# Patient Record
Sex: FEMALE | Race: WHITE | NOT HISPANIC OR LATINO | Employment: OTHER | URBAN - METROPOLITAN AREA
[De-identification: names, ages, dates, MRNs, and addresses within clinical notes are randomized per-mention and may not be internally consistent; named-entity substitution may affect disease eponyms.]

---

## 2017-09-21 ENCOUNTER — HOSPITAL ENCOUNTER (OUTPATIENT)
Dept: ONCOLOGY | Facility: CLINIC | Age: 62
Setting detail: INFUSION SERIES
Discharge: HOME | End: 2017-09-21
Attending: INTERNAL MEDICINE | Admitting: INTERNAL MEDICINE

## 2017-09-21 ENCOUNTER — CLINICAL SUPPORT (OUTPATIENT)
Dept: ONCOLOGY | Facility: HOSPITAL | Age: 62
End: 2017-09-21

## 2017-09-21 ENCOUNTER — HOSPITAL ENCOUNTER (OUTPATIENT)
Dept: ONCOLOGY | Facility: HOSPITAL | Age: 62
Discharge: HOME | End: 2017-09-21
Attending: INTERNAL MEDICINE | Admitting: INTERNAL MEDICINE

## 2017-09-21 LAB
ALBUMIN SERPL-MCNC: 4.2 G/DL (ref 3.5–4.8)
ALBUMIN/GLOB SERPL: 1.4 {RATIO} (ref 1–1.7)
ALP SERPL-CCNC: 67 IU/L (ref 32–91)
ALT SERPL-CCNC: 26 IU/L (ref 14–54)
ANION GAP SERPL CALC-SCNC: 14.5 MMOL/L (ref 10–20)
AST SERPL-CCNC: 29 IU/L (ref 15–41)
BILIRUB SERPL-MCNC: 1.1 MG/DL (ref 0.3–1.2)
BUN SERPL-MCNC: 13 MG/DL (ref 8–20)
BUN/CREAT SERPL: 14.4 (ref 5.4–26.2)
CALCIUM SERPL-MCNC: 9.6 MG/DL (ref 8.9–10.3)
CHLORIDE SERPL-SCNC: 100 MMOL/L (ref 101–111)
CONV CO2: 26 MMOL/L (ref 22–32)
CONV TOTAL PROTEIN: 7.1 G/DL (ref 6.1–7.9)
CREAT UR-MCNC: 0.9 MG/DL (ref 0.4–1)
GLOBULIN UR ELPH-MCNC: 2.9 G/DL (ref 2.5–3.8)
GLUCOSE SERPL-MCNC: 180 MG/DL (ref 65–99)
POTASSIUM SERPL-SCNC: 3.5 MMOL/L (ref 3.6–5.1)
SODIUM SERPL-SCNC: 137 MMOL/L (ref 136–144)

## 2017-09-21 NOTE — PROGRESS NOTES
PATIENTS ONCOLOGY RECORD LOCATED IN [facility]      Subjective     Name:  GUILHERME ANDERSON     Date:  2017  Address:  [address] [place] [address] [place] [address]  Home: [phone]  :  1955 AGE:  61 y.o.        RECORDS OBTAINED:  Patients Oncology Record is located in [facility]

## 2017-10-25 ENCOUNTER — HOSPITAL ENCOUNTER (OUTPATIENT)
Dept: OTHER | Facility: HOSPITAL | Age: 62
Discharge: HOME | End: 2017-10-25
Attending: INTERNAL MEDICINE | Admitting: INTERNAL MEDICINE

## 2018-09-20 ENCOUNTER — CLINICAL SUPPORT (OUTPATIENT)
Dept: ONCOLOGY | Facility: HOSPITAL | Age: 63
End: 2018-09-20

## 2018-09-20 ENCOUNTER — HOSPITAL ENCOUNTER (OUTPATIENT)
Dept: ONCOLOGY | Facility: HOSPITAL | Age: 63
Discharge: HOME | End: 2018-09-20
Attending: INTERNAL MEDICINE | Admitting: INTERNAL MEDICINE

## 2018-09-20 ENCOUNTER — HOSPITAL ENCOUNTER (OUTPATIENT)
Dept: ONCOLOGY | Facility: CLINIC | Age: 63
Setting detail: INFUSION SERIES
Discharge: HOME | End: 2018-09-20
Attending: INTERNAL MEDICINE | Admitting: INTERNAL MEDICINE

## 2018-09-20 LAB
ALBUMIN SERPL-MCNC: 4.2 G/DL (ref 3.5–4.8)
ALBUMIN/GLOB SERPL: 1.4 {RATIO} (ref 1–1.7)
ALP SERPL-CCNC: 69 IU/L (ref 32–91)
ALT SERPL-CCNC: 28 IU/L (ref 14–54)
ANION GAP SERPL CALC-SCNC: 13.8 MMOL/L (ref 10–20)
AST SERPL-CCNC: 30 IU/L (ref 15–41)
BILIRUB SERPL-MCNC: 1.2 MG/DL (ref 0.3–1.2)
BUN SERPL-MCNC: 11 MG/DL (ref 8–20)
BUN/CREAT SERPL: 11 (ref 5.4–26.2)
CALCIUM SERPL-MCNC: 9.5 MG/DL (ref 8.9–10.3)
CHLORIDE SERPL-SCNC: 102 MMOL/L (ref 101–111)
CONV CO2: 26 MMOL/L (ref 22–32)
CONV TOTAL PROTEIN: 7.1 G/DL (ref 6.1–7.9)
CREAT UR-MCNC: 1 MG/DL (ref 0.4–1)
GLOBULIN UR ELPH-MCNC: 2.9 G/DL (ref 2.5–3.8)
GLUCOSE SERPL-MCNC: 162 MG/DL (ref 65–99)
MAGNESIUM SERPL-MCNC: 1.8 MG/DL (ref 1.8–2.5)
POTASSIUM SERPL-SCNC: 3.8 MMOL/L (ref 3.6–5.1)
SODIUM SERPL-SCNC: 138 MMOL/L (ref 136–144)

## 2018-09-20 NOTE — PROGRESS NOTES
PATIENTS ONCOLOGY RECORD LOCATED IN [facility]      Subjective     Name:  GUILHERME ANDERSON     Date:  2018  Address:  [address] [place] [address]  Home: [phone]  :  1955 AGE:  62 y.o.        RECORDS OBTAINED:  Patients Oncology Record is located in [facility]

## 2018-11-19 ENCOUNTER — HOSPITAL ENCOUNTER (OUTPATIENT)
Dept: MAMMOGRAPHY | Facility: HOSPITAL | Age: 63
Discharge: HOME | End: 2018-11-19
Attending: INTERNAL MEDICINE | Admitting: INTERNAL MEDICINE

## 2019-08-20 RX ORDER — LEVOTHYROXINE SODIUM 125 MCG
125 TABLET ORAL DAILY
Qty: 90 TABLET | Refills: 1 | Status: SHIPPED | OUTPATIENT
Start: 2019-08-20 | End: 2019-12-16 | Stop reason: SDUPTHER

## 2019-12-16 RX ORDER — ROSUVASTATIN CALCIUM 20 MG/1
TABLET, COATED ORAL
Qty: 90 TABLET | Refills: 2 | Status: SHIPPED | OUTPATIENT
Start: 2019-12-16 | End: 2020-09-23

## 2019-12-16 RX ORDER — LEVOTHYROXINE SODIUM 0.12 MG/1
TABLET ORAL
Qty: 90 TABLET | Refills: 1 | Status: SHIPPED | OUTPATIENT
Start: 2019-12-16 | End: 2020-06-26 | Stop reason: SDUPTHER

## 2020-02-18 ENCOUNTER — OFFICE VISIT (OUTPATIENT)
Dept: FAMILY MEDICINE CLINIC | Facility: CLINIC | Age: 65
End: 2020-02-18

## 2020-02-18 VITALS
DIASTOLIC BLOOD PRESSURE: 78 MMHG | HEART RATE: 86 BPM | TEMPERATURE: 98.1 F | BODY MASS INDEX: 24.41 KG/M2 | WEIGHT: 143 LBS | OXYGEN SATURATION: 97 % | SYSTOLIC BLOOD PRESSURE: 120 MMHG | HEIGHT: 64 IN

## 2020-02-18 DIAGNOSIS — G56.31 RADIAL NERVE COMPRESSION, RIGHT: ICD-10-CM

## 2020-02-18 DIAGNOSIS — Z12.31 ENCOUNTER FOR SCREENING MAMMOGRAM FOR BREAST CANCER: ICD-10-CM

## 2020-02-18 DIAGNOSIS — E78.5 DYSLIPIDEMIA: ICD-10-CM

## 2020-02-18 DIAGNOSIS — C50.911 CARCINOMA OF RIGHT FEMALE BREAST, UNSPECIFIED ESTROGEN RECEPTOR STATUS, UNSPECIFIED SITE OF BREAST (HCC): ICD-10-CM

## 2020-02-18 DIAGNOSIS — E03.9 ACQUIRED HYPOTHYROIDISM: ICD-10-CM

## 2020-02-18 DIAGNOSIS — Z13.220 SCREENING FOR CHOLESTEROL LEVEL: Primary | ICD-10-CM

## 2020-02-18 DIAGNOSIS — G47.00 INSOMNIA, UNSPECIFIED TYPE: ICD-10-CM

## 2020-02-18 PROCEDURE — 99204 OFFICE O/P NEW MOD 45 MIN: CPT | Performed by: FAMILY MEDICINE

## 2020-02-18 RX ORDER — ZOLPIDEM TARTRATE 10 MG/1
TABLET ORAL
Qty: 30 TABLET | Refills: 3 | Status: SHIPPED | OUTPATIENT
Start: 2020-02-18 | End: 2020-08-07

## 2020-02-18 RX ORDER — ZOLPIDEM TARTRATE 10 MG/1
10 TABLET ORAL NIGHTLY PRN
COMMUNITY
End: 2020-02-18 | Stop reason: SDUPTHER

## 2020-02-18 NOTE — PATIENT INSTRUCTIONS
Radial Nerve Palsy    Radial nerve palsy is the loss of function of the radial nerve in your arm or hand. The radial nerve extends from your shoulder, around the back of your upper arm, and down the outside of your lower arm. An injury to this nerve causes certain muscles and tendons in your arm and wrist not to work properly, which leads to a condition known as wrist drop. This means that you cannot extend your wrist. If you are standing with your arm stretched straight out in front of you, your wrist will bend and your hand will drop down toward the floor.  An injury to the radial nerve may also result in lost feeling (sensation) in parts of your arm.  What are the causes?  Common causes of this condition include:  · A break (fracture) of your upper or lower arm bone.  · Complications from surgery.  · Improper use of crutches. Crutches that are too long can put pressure on the radial nerve where it passes through the armpit (crutch palsy).  · Keeping your arm in a position that places prolonged pressure on the radial nerve, such as by sleeping with arms over the back of a chair (Saturday night palsy).  · Performing repetitive activities that involve rotation of your lower arm or movement of your wrist (repetitive use injury).  What increases the risk?  You are more likely to develop this condition if you:  · Play contact sports.  · Have a condition in which your body's immune system attacks your joints (rheumatoid arthritis).  · Have diabetes.  · Have an underactive thyroid (hypothyroidism).  What are the signs or symptoms?  Symptoms of this condition include:  · Inability to extend your wrist.  · Difficulty straightening your elbow and wrist.  · Numbness or tingling in the back of your arm, forearm, or hand.  · Inability to pinch.  · Muscle of the injured arm looking smaller.  How is this diagnosed?  This condition is diagnosed with a history of the injury and a physical exam. To confirm the diagnosis, you may  also have tests, including:  · Ultrasound. This test show if the nerve has an injury.  · Nerve conduction studies. These tests show if the radial nerve is sending electrical signals well.  · X-rays. These may be done if your health care provider suspects that you have an injury to the bones in your arm.  · MRI. This may be used to determine the cause of your radial nerve palsy or to rule out other causes of your symptoms.  How is this treated?         Treatment for this condition depends on the cause. It may involve:  · Medicines.  ? NSAIDs may be used to control pain.  ? A steroid injection may be used to decrease swelling around the nerve.  · Physical and occupational therapy. This may help you:  ? Regain strength in your hand and wrist.   ? Maintain range of motion of your wrist and elbow.  · Splinting. Your health care provider may make one or more splints for you to wear during the day or at night to help with motion and positioning of your wrist.  · Removing pressure on the radial nerve. This is done if the condition is caused by pressure on the nerve. Using this treatment may allow the nerve to go back to normal within a few weeks or a few months.  · Surgery. Depending on the cause of your radial nerve palsy, surgery may be needed to:  ? Remove pressure on the nerve (entrapment).  ? Repair broken bones.  ? Relocate (transfer) tendons in your lower arm to help you regain strength and mobility of your wrist.  ? Transfer a nerve to the injury site to restore nerve function.  Follow these instructions at home:  If you have a splint or brace  · Wear the splint or brace as told by your health care provider. Remove it only as told by your health care provider.  · Loosen the splint or brace if your fingers tingle, become numb, or turn cold and blue.  · Keep the splint or brace clean.  · If the splint or brace is not waterproof:  ? Do not let it get wet.  ? Cover it with a watertight covering when you take a bath or a  shower.  Managing pain, stiffness, and swelling  · If directed, put ice on the injured area:  ? If you have a removable splint or brace, remove it as told by your health care provider.  ? Put ice in a plastic bag.  ? Place a towel between your skin and the bag.  ? Leave the ice on for 20 minutes, 2-3 times a day.  · Move your fingers often to avoid stiffness and to lessen swelling.  · Raise (elevate) the injured area above the level of your heart while you are sitting or lying down.  General instructions  · Follow your health care provider's instructions about how to protect your hand and wrist.  · Protect your hand from extreme temperature injuries, such as burns and frostbite.  · Exercise your hand, wrist, and arm on a regular basis, as directed by your health care provider or therapist.  · Keep all follow-up visits as told by your health care provider. This is important.  Contact a health care provider if you:  · Have a sudden increase in pain.  · Develop new numbness or new loss of sensation in your hand.  Get help right away if you:  · Have a sudden change in your ability to move your arm, wrist, or hand.  · Notice your fingers become bluish in color or feel cold to the touch.  Summary  · Radial nerve palsy is the loss of function of the radial nerve in your arm or hand. That nerve extends from your shoulder, around the back of your upper arm, and down the outside of your lower arm.  · An injury to the radial nerve causes certain muscles and tendons in your arm and wrist not to work properly. This makes you unable to extend your wrist (a condition known as wrist drop). You may also lose feeling (sensation) in parts of your arm.  · Treatment for this condition depends on the cause. It may include removing pressure on the radial nerve, physical and occupational therapy, splinting, medicines, or surgery.  This information is not intended to replace advice given to you by your health care provider. Make sure you  discuss any questions you have with your health care provider.  Document Released: 08/24/2007 Document Revised: 01/10/2019 Document Reviewed: 01/10/2019  [employer] Interactive Patient Education © 2019 [employer]    Carpal Tunnel Syndrome    Carpal tunnel syndrome is a condition that causes pain in your hand and arm. The carpal tunnel is a narrow area located on the palm side of your wrist. Repeated wrist motion or certain diseases may cause swelling within the tunnel. This swelling pinches the main nerve in the wrist (median nerve).  What are the causes?  This condition may be caused by:  · Repeated wrist motions.  · Wrist injuries.  · Arthritis.  · A cyst or tumor in the carpal tunnel.  · Fluid buildup during pregnancy.  Sometimes the cause of this condition is not known.  What increases the risk?  The following factors may make you more likely to develop this condition:  · Having a job, such as being a  or a , that requires you to repeatedly move your wrist in the same motion.  · Being a woman.  · Having certain conditions, such as:  ? Diabetes.  ? Obesity.  ? An underactive thyroid (hypothyroidism).  ? Kidney failure.  What are the signs or symptoms?  Symptoms of this condition include:  · A tingling feeling in your fingers, especially in your thumb, index, and middle fingers.  · Tingling or numbness in your hand.  · An aching feeling in your entire arm, especially when your wrist and elbow are bent for a long time.  · Wrist pain that goes up your arm to your shoulder.  · Pain that goes down into your palm or fingers.  · A weak feeling in your hands. You may have trouble grabbing and holding items.  Your symptoms may feel worse during the night.  How is this diagnosed?  This condition is diagnosed with a medical history and physical exam. You may also have tests, including:  · Electromyogram (EMG). This test measures electrical signals sent by your nerves into the muscles.  · Nerve conduction  study. This test measures how well electrical signals pass through your nerves.  · Imaging tests, such as X-rays, ultrasound, and MRI. These tests check for possible causes of your condition.  How is this treated?  This condition may be treated with:  · Lifestyle changes. It is important to stop or change the activity that caused your condition.  · Doing exercise and activities to strengthen your muscles and bones (physical therapy).  · Learning how to use your hand again after diagnosis (occupational therapy).  · Medicines for pain and inflammation. This may include medicine that is injected into your wrist.  · A wrist splint.  · Surgery.  Follow these instructions at home:  If you have a splint:  · Wear the splint as told by your health care provider. Remove it only as told by your health care provider.  · Loosen the splint if your fingers tingle, become numb, or turn cold and blue.  · Keep the splint clean.  · If the splint is not waterproof:  ? Do not let it get wet.  ? Cover it with a watertight covering when you take a bath or shower.  Managing pain, stiffness, and swelling    · If directed, put ice on the painful area:  ? If you have a removable splint, remove it as told by your health care provider.  ? Put ice in a plastic bag.  ? Place a towel between your skin and the bag.  ? Leave the ice on for 20 minutes, 2-3 times per day.  General instructions  · Take over-the-counter and prescription medicines only as told by your health care provider.  · Rest your wrist from any activity that may be causing your pain. If your condition is work related, talk with your employer about changes that can be made, such as getting a wrist pad to use while typing.  · Do any exercises as told by your health care provider, physical therapist, or occupational therapist.  · Keep all follow-up visits as told by your health care provider. This is important.  Contact a health care provider if:  · You have new symptoms.  · Your pain  is not controlled with medicines.  · Your symptoms get worse.  Get help right away if:  · You have severe numbness or tingling in your wrist or hand.  Summary  · Carpal tunnel syndrome is a condition that causes pain in your hand and arm.  · It is usually caused by repeated wrist motions.  · Lifestyle changes and medicines are used to treat carpal tunnel syndrome. Surgery may be recommended.  · Follow your health care provider's instructions about wearing a splint, resting from activity, keeping follow-up visits, and calling for help.  This information is not intended to replace advice given to you by your health care provider. Make sure you discuss any questions you have with your health care provider.  Document Released: 12/15/2001 Document Revised: 04/26/2019 Document Reviewed: 04/26/2019  [employer]vier Interactive Patient Education © 2019 [employer]

## 2020-02-18 NOTE — PROGRESS NOTES
Chief Complaint   Patient presents with   • Hypothyroidism   • Hyperlipidemia   • Insomnia       Subjective     Anastasiya Terrell is a 64 y.o. female.  She is presenting to [facility], previously seeing nurse practitioner Norma Rodriguez.  Past medical history is significant for:  Breast cancer of the right breast in 2005 this was initially staged at stage IV with a PET scan showing extensive disease.  She was treated with Taxotere and Taxol for 6 months.  She then received Herceptin and a follow-up CT was then negative, she did complete 5 years of tamoxifen in 2014.  Previously treated by Dr. Vora, has been released from oncology last year due to change in insurance.  She has kept up with routine mammograms and has not had recurrence of disease.  Osteopenia diagnosed in 2008.   hyperlipidemia, managed on Crestor 20 mg, no side effects or problems.  Hypothyroidism, on levothyroxine 125 doing well  insomnia, using Ambien, denies next-day sedation or odd behaviors while using medication.  She reports insomnia started while she was taking hormones following her breast cancer and has needed medication every night for the past 10 years or more.  She states when she gets to sleep she is able to remain sleeping.  She has occasionally been able to use Benadryl, ZzzQuil, Tylenol or Advil PM.  She is also been able to reduce Ambien to half tablets and sometimes even third tablets.  Additional concern right thumb discomfort and poor , she reports she is occasionally dropping things like her curling iron.  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  Just prior to shelter she thought she had been developing carpal tunnel but symptoms improved after shelter.    Preventative care:  Mammogram November 20, 2018, negative  DEXA scan 10/30/2019, T score -2.0  Pap 3 years ago Dr. Jacobs in [place], prior to that had annual exams, no abnormal  "Paps.  Colonoscopy, Dr. Friend, patient reports approximately 3 years ago a non-adenomatous polyp and told to follow-up in 10 years.  We will obtain this record for confirmation.    The following portions of the patient's history were reviewed and updated as appropriate: allergies, current medications, past family history, past medical history, past social history, past surgical history and problem list.    Current Outpatient Medications on File Prior to Visit   Medication Sig   • levothyroxine (SYNTHROID, LEVOTHROID) 125 MCG tablet TAKE ONE TABLET BY MOUTH EVERY DAY   • rosuvastatin (CRESTOR) 20 MG tablet TAKE ONE TABLET BY MOUTH EVERY DAY   • [DISCONTINUED] zolpidem (AMBIEN) 10 MG tablet Take 10 mg by mouth At Night As Needed for Sleep.     No current facility-administered medications on file prior to visit.      Medications Discontinued During This Encounter   Medication Reason   • zolpidem (AMBIEN) 10 MG tablet Reorder       Review of Systems   Constitutional: Negative for fatigue and fever.   Respiratory: Negative for cough and shortness of breath.    Cardiovascular: Negative for chest pain, palpitations and leg swelling.   Gastrointestinal: Negative.    Skin: Negative for rash.   Neurological: Positive for weakness and numbness.        Right thumb   Psychiatric/Behavioral: Negative for depressed mood.        Objective   Vitals:    02/18/20 0905   BP: 120/78   BP Location: Left arm   Patient Position: Sitting   Cuff Size: Adult   Pulse: 86   Temp: 98.1 °F (36.7 °C)   TempSrc: Oral   SpO2: 97%   Weight: 64.9 kg (143 lb)   Height: 162.6 cm (64.02\")      Body mass index is 24.53 kg/m².    Physical Exam   Constitutional: She is oriented to person, place, and time. She appears well-developed and well-nourished. No distress.   Eyes: Pupils are equal, round, and reactive to light. Conjunctivae and EOM are normal.   Neck: Normal range of motion.   Cardiovascular: Normal rate and regular rhythm.   No murmur " heard.  Pulmonary/Chest: Effort normal. She has no wheezes.   Musculoskeletal: Normal range of motion. She exhibits no edema.    negative grind test of right thumb Bilateral hands and wrists nontender to palpation,with normal strength and tone negative Finkelstein's testing negative Phalen's testing mildly positive Tinel's testing at wrist at right radial nerve.   Neurological: She is alert and oriented to person, place, and time.   Skin: Skin is warm and dry.   Psychiatric: She has a normal mood and affect.   Nursing note and vitals reviewed.          No results found for: HGBA1C   Last labs in chart were March 27, 2019.  TSH at that time was 3.39  The 10-year ASCVD risk score (Carmennati BUTTS JrMely, et al., 2013) is: 5%    Values used to calculate the score:      Age: 64 years      Sex: Female      Is Non- : No      Diabetic: No      Tobacco smoker: No      Systolic Blood Pressure: 120 mmHg      Is BP treated: No      HDL Cholesterol: 53 mg/dL      Total Cholesterol: 197 mg/dL    Procedures     Assessment/Plan   Diagnoses and all orders for this visit:    1. Screening for cholesterol level (Primary)    2. Encounter for screening mammogram for breast cancer  -     Mammo Screening Digital Tomosynthesis Bilateral With CAD; Future    3. Dyslipidemia  -     Comprehensive Metabolic Panel  -     Lipid Panel    4. Insomnia, unspecified type  -     zolpidem (AMBIEN) 10 MG tablet; 1/2 to 1 tablet at hs  Dispense: 30 tablet; Refill: 3    5. Acquired hypothyroidism  -     TSH  -     T4, free    6. Carcinoma of right female breast, unspecified estrogen receptor status, unspecified site of breast (CMS/HCC)  -     CBC & Differential    7. Radial nerve compression, right    Other orders  -     Cancel: MicroAlbumin, Urine, Random - Urine, Clean Catch        History of breast cancer, ordering mammogram.  Dyslipidemia and hypothyroidism, checking labs and will adjust medications if needed  Right hand with radial  neuritis at the wrist causing weakness of thumb grasp.  Advised and relative rest, consideration of a cock-up splint or thumb spica splint and if does not improve possible referral to hand surgeons for steroid injection.  Medications Discontinued During This Encounter   Medication Reason   • zolpidem (AMBIEN) 10 MG tablet Reorder          Return for welcome to [employer] [facility].        AVS given

## 2020-02-19 LAB
ALBUMIN SERPL-MCNC: 5 G/DL (ref 3.8–4.8)
ALBUMIN/GLOB SERPL: 1.9 {RATIO} (ref 1.2–2.2)
ALP SERPL-CCNC: 90 IU/L (ref 39–117)
ALT SERPL-CCNC: 31 IU/L (ref 0–32)
AST SERPL-CCNC: 31 IU/L (ref 0–40)
BASOPHILS # BLD AUTO: 0.1 X10E3/UL (ref 0–0.2)
BASOPHILS NFR BLD AUTO: 1 %
BILIRUB SERPL-MCNC: 1.1 MG/DL (ref 0–1.2)
BUN SERPL-MCNC: 11 MG/DL (ref 8–27)
BUN/CREAT SERPL: 11 (ref 12–28)
CALCIUM SERPL-MCNC: 10.2 MG/DL (ref 8.7–10.3)
CHLORIDE SERPL-SCNC: 101 MMOL/L (ref 96–106)
CHOLEST SERPL-MCNC: 236 MG/DL (ref 100–199)
CO2 SERPL-SCNC: 26 MMOL/L (ref 20–29)
CREAT SERPL-MCNC: 1.01 MG/DL (ref 0.57–1)
EOSINOPHIL # BLD AUTO: 0.2 X10E3/UL (ref 0–0.4)
EOSINOPHIL NFR BLD AUTO: 3 %
ERYTHROCYTE [DISTWIDTH] IN BLOOD BY AUTOMATED COUNT: 12.8 % (ref 11.7–15.4)
GLOBULIN SER CALC-MCNC: 2.7 G/DL (ref 1.5–4.5)
GLUCOSE SERPL-MCNC: 86 MG/DL (ref 65–99)
HCT VFR BLD AUTO: 48.5 % (ref 34–46.6)
HDLC SERPL-MCNC: 59 MG/DL
HGB BLD-MCNC: 16.1 G/DL (ref 11.1–15.9)
IMM GRANULOCYTES # BLD AUTO: 0 X10E3/UL (ref 0–0.1)
IMM GRANULOCYTES NFR BLD AUTO: 0 %
LDLC SERPL CALC-MCNC: 134 MG/DL (ref 0–99)
LYMPHOCYTES # BLD AUTO: 2.7 X10E3/UL (ref 0.7–3.1)
LYMPHOCYTES NFR BLD AUTO: 42 %
MCH RBC QN AUTO: 29.6 PG (ref 26.6–33)
MCHC RBC AUTO-ENTMCNC: 33.2 G/DL (ref 31.5–35.7)
MCV RBC AUTO: 89 FL (ref 79–97)
MONOCYTES # BLD AUTO: 0.6 X10E3/UL (ref 0.1–0.9)
MONOCYTES NFR BLD AUTO: 9 %
NEUTROPHILS # BLD AUTO: 2.9 X10E3/UL (ref 1.4–7)
NEUTROPHILS NFR BLD AUTO: 45 %
PLATELET # BLD AUTO: 278 X10E3/UL (ref 150–450)
POTASSIUM SERPL-SCNC: 4.4 MMOL/L (ref 3.5–5.2)
PROT SERPL-MCNC: 7.7 G/DL (ref 6–8.5)
RBC # BLD AUTO: 5.44 X10E6/UL (ref 3.77–5.28)
SODIUM SERPL-SCNC: 143 MMOL/L (ref 134–144)
T4 FREE SERPL-MCNC: 2.2 NG/DL (ref 0.82–1.77)
TRIGL SERPL-MCNC: 214 MG/DL (ref 0–149)
TSH SERPL DL<=0.005 MIU/L-ACNC: 0.48 UIU/ML (ref 0.45–4.5)
VLDLC SERPL CALC-MCNC: 43 MG/DL (ref 5–40)
WBC # BLD AUTO: 6.5 X10E3/UL (ref 3.4–10.8)

## 2020-02-24 DIAGNOSIS — E78.5 DYSLIPIDEMIA: Primary | ICD-10-CM

## 2020-02-24 DIAGNOSIS — E03.9 ACQUIRED HYPOTHYROIDISM: ICD-10-CM

## 2020-02-24 DIAGNOSIS — I10 HYPERTENSION, UNSPECIFIED TYPE: ICD-10-CM

## 2020-02-27 ENCOUNTER — HOSPITAL ENCOUNTER (OUTPATIENT)
Dept: MAMMOGRAPHY | Facility: HOSPITAL | Age: 65
Discharge: HOME | End: 2020-02-27
Admitting: FAMILY MEDICINE

## 2020-02-27 DIAGNOSIS — Z12.31 ENCOUNTER FOR SCREENING MAMMOGRAM FOR BREAST CANCER: ICD-10-CM

## 2020-02-27 PROCEDURE — 77067 SCR MAMMO BI INCL CAD: CPT

## 2020-02-27 PROCEDURE — 77063 BREAST TOMOSYNTHESIS BI: CPT

## 2020-03-07 RX ORDER — ROSUVASTATIN CALCIUM 20 MG/1
20 TABLET, COATED ORAL DAILY
Qty: 90 TABLET | Refills: 2 | OUTPATIENT
Start: 2020-03-07

## 2020-03-07 RX ORDER — LEVOTHYROXINE SODIUM 0.12 MG/1
125 TABLET ORAL DAILY
Qty: 90 TABLET | Refills: 1 | OUTPATIENT
Start: 2020-03-07

## 2020-05-08 ENCOUNTER — RESULTS ENCOUNTER (OUTPATIENT)
Dept: FAMILY MEDICINE CLINIC | Facility: CLINIC | Age: 65
End: 2020-05-08

## 2020-05-08 DIAGNOSIS — E03.9 ACQUIRED HYPOTHYROIDISM: ICD-10-CM

## 2020-05-08 DIAGNOSIS — E78.5 DYSLIPIDEMIA: ICD-10-CM

## 2020-05-08 DIAGNOSIS — I10 HYPERTENSION, UNSPECIFIED TYPE: ICD-10-CM

## 2020-06-23 DIAGNOSIS — E03.9 ACQUIRED HYPOTHYROIDISM: Primary | ICD-10-CM

## 2020-06-23 RX ORDER — LEVOTHYROXINE SODIUM 0.12 MG/1
TABLET ORAL
Qty: 90 TABLET | Refills: 1 | OUTPATIENT
Start: 2020-06-23

## 2020-06-26 RX ORDER — LEVOTHYROXINE SODIUM 0.12 MG/1
125 TABLET ORAL DAILY
Qty: 90 TABLET | Refills: 0 | Status: SHIPPED | OUTPATIENT
Start: 2020-06-26 | End: 2020-10-04 | Stop reason: DRUGHIGH

## 2020-06-26 NOTE — TELEPHONE ENCOUNTER
I am renewing levothyroxine, see she is scheduled for labs on June 30 including thyroid testing, and has appointment on July 14 with me.

## 2020-07-01 LAB
ALBUMIN SERPL-MCNC: 4.7 G/DL (ref 3.8–4.8)
ALBUMIN/GLOB SERPL: 1.9 {RATIO} (ref 1.2–2.2)
ALP SERPL-CCNC: 77 IU/L (ref 39–117)
ALT SERPL-CCNC: 20 IU/L (ref 0–32)
AST SERPL-CCNC: 20 IU/L (ref 0–40)
BASOPHILS # BLD AUTO: 0.1 X10E3/UL (ref 0–0.2)
BASOPHILS NFR BLD AUTO: 1 %
BILIRUB SERPL-MCNC: 1.2 MG/DL (ref 0–1.2)
BUN SERPL-MCNC: 12 MG/DL (ref 8–27)
BUN/CREAT SERPL: 14 (ref 12–28)
CALCIUM SERPL-MCNC: 9.7 MG/DL (ref 8.7–10.3)
CHLORIDE SERPL-SCNC: 104 MMOL/L (ref 96–106)
CHOLEST SERPL-MCNC: 179 MG/DL (ref 100–199)
CO2 SERPL-SCNC: 26 MMOL/L (ref 20–29)
CREAT SERPL-MCNC: 0.85 MG/DL (ref 0.57–1)
EOSINOPHIL # BLD AUTO: 0.1 X10E3/UL (ref 0–0.4)
EOSINOPHIL NFR BLD AUTO: 2 %
ERYTHROCYTE [DISTWIDTH] IN BLOOD BY AUTOMATED COUNT: 12.8 % (ref 11.7–15.4)
GLOBULIN SER CALC-MCNC: 2.5 G/DL (ref 1.5–4.5)
GLUCOSE SERPL-MCNC: 81 MG/DL (ref 65–99)
HCT VFR BLD AUTO: 46.6 % (ref 34–46.6)
HDLC SERPL-MCNC: 55 MG/DL
HGB BLD-MCNC: 15.1 G/DL (ref 11.1–15.9)
IMM GRANULOCYTES # BLD AUTO: 0 X10E3/UL (ref 0–0.1)
IMM GRANULOCYTES NFR BLD AUTO: 0 %
LDLC SERPL CALC-MCNC: 90 MG/DL (ref 0–99)
LYMPHOCYTES # BLD AUTO: 1.9 X10E3/UL (ref 0.7–3.1)
LYMPHOCYTES NFR BLD AUTO: 36 %
MCH RBC QN AUTO: 29.2 PG (ref 26.6–33)
MCHC RBC AUTO-ENTMCNC: 32.4 G/DL (ref 31.5–35.7)
MCV RBC AUTO: 90 FL (ref 79–97)
MONOCYTES # BLD AUTO: 0.5 X10E3/UL (ref 0.1–0.9)
MONOCYTES NFR BLD AUTO: 8 %
NEUTROPHILS # BLD AUTO: 2.9 X10E3/UL (ref 1.4–7)
NEUTROPHILS NFR BLD AUTO: 53 %
PLATELET # BLD AUTO: 239 X10E3/UL (ref 150–450)
POTASSIUM SERPL-SCNC: 4.1 MMOL/L (ref 3.5–5.2)
PROT SERPL-MCNC: 7.2 G/DL (ref 6–8.5)
RBC # BLD AUTO: 5.17 X10E6/UL (ref 3.77–5.28)
SODIUM SERPL-SCNC: 143 MMOL/L (ref 134–144)
T4 FREE SERPL-MCNC: 2.24 NG/DL (ref 0.82–1.77)
TRIGL SERPL-MCNC: 172 MG/DL (ref 0–149)
TSH SERPL DL<=0.005 MIU/L-ACNC: 0.37 UIU/ML (ref 0.45–4.5)
VLDLC SERPL CALC-MCNC: 34 MG/DL (ref 5–40)
WBC # BLD AUTO: 5.4 X10E3/UL (ref 3.4–10.8)

## 2020-07-02 DIAGNOSIS — E03.9 ACQUIRED HYPOTHYROIDISM: ICD-10-CM

## 2020-07-02 RX ORDER — LEVOTHYROXINE SODIUM 112 UG/1
112 TABLET ORAL DAILY
Qty: 90 TABLET | Refills: 1 | Status: SHIPPED | OUTPATIENT
Start: 2020-07-02 | End: 2020-10-27

## 2020-07-14 ENCOUNTER — OFFICE VISIT (OUTPATIENT)
Dept: FAMILY MEDICINE CLINIC | Facility: CLINIC | Age: 65
End: 2020-07-14

## 2020-07-14 VITALS
HEIGHT: 64 IN | DIASTOLIC BLOOD PRESSURE: 75 MMHG | WEIGHT: 140 LBS | HEART RATE: 94 BPM | OXYGEN SATURATION: 96 % | TEMPERATURE: 98 F | SYSTOLIC BLOOD PRESSURE: 108 MMHG | BODY MASS INDEX: 23.9 KG/M2

## 2020-07-14 DIAGNOSIS — T45.1X5A CHEMOTHERAPY-INDUCED PERIPHERAL NEUROPATHY (HCC): ICD-10-CM

## 2020-07-14 DIAGNOSIS — E03.9 ACQUIRED HYPOTHYROIDISM: Primary | ICD-10-CM

## 2020-07-14 DIAGNOSIS — R00.2 PALPITATIONS: ICD-10-CM

## 2020-07-14 DIAGNOSIS — E78.5 DYSLIPIDEMIA: ICD-10-CM

## 2020-07-14 DIAGNOSIS — G62.0 CHEMOTHERAPY-INDUCED PERIPHERAL NEUROPATHY (HCC): ICD-10-CM

## 2020-07-14 PROCEDURE — 99214 OFFICE O/P EST MOD 30 MIN: CPT | Performed by: FAMILY MEDICINE

## 2020-07-14 NOTE — PROGRESS NOTES
Chief Complaint   Patient presents with   • Hypothyroidism   • Hyperlipidemia       Subjective     Anastasiya Terrell is a 64 y.o. female. Patient present to follow up on thyroid and lab results..She was scheduled for a Medicare wellness visit, but was not scheduled this way)    Hypothyroidism, was slightly overtreated reduce levothyroxine from 125 to 112 mcg daily about 10 days ago.     Palpitations, she notices a little fluttering maybe once or twice a week but only last for a few minutes at a time.  She has not noticed in the past week since she decreased her levothyroxine.  Additionally she always feels a little pressured/driven, do wonder if this has to do with her thyroid.    Hyperlipidemia doing well on Crestor 20 mg without myalgias or other concerns.    History of right breast cancer in 2005 completed 5 years of tamoxifen in 2014.  She did have a mammogram in February, reported as BI-RADS 2.    Osteopenia, last DEXA scan in 2017, declines getting repeat DEXA scan at this time.    Colonoscopy 11/16/2016 Dr. Queen, few nonbleeding diverticula.  Small internal hemorrhoids, single 6 mm polyp in sigmoid colon of benign appearance removed patient states she was told to repeat in 10 years, presumptively polyp was hyperplastic.      The following portions of the patient's history were reviewed and updated as appropriate: allergies, current medications, past family history, past medical history, past social history, past surgical history and problem list.    Current Outpatient Medications on File Prior to Visit   Medication Sig   • levothyroxine (Synthroid) 112 MCG tablet Take 1 tablet by mouth Daily.   • rosuvastatin (CRESTOR) 20 MG tablet TAKE ONE TABLET BY MOUTH EVERY DAY   • zolpidem (AMBIEN) 10 MG tablet 1/2 to 1 tablet at hs   • levothyroxine (SYNTHROID, LEVOTHROID) 125 MCG tablet Take 1 tablet by mouth Daily.     No current facility-administered medications on file prior to visit.      There are no discontinued  "medications.    Review of Systems   Constitutional: Negative for fatigue and fever.   Respiratory: Negative for cough and shortness of breath.    Cardiovascular: Positive for palpitations. Negative for chest pain and leg swelling.   Gastrointestinal: Negative.    Skin: Negative for rash.   Neurological: Positive for weakness and numbness ( Right greater than left foot, neuropathy from chemotherapy).        Right thumb   Psychiatric/Behavioral: Negative for depressed mood.        Objective   Vitals:    07/14/20 0908   BP: 108/75   BP Location: Left arm   Patient Position: Sitting   Cuff Size: Adult   Pulse: 94   Temp: 98 °F (36.7 °C)   TempSrc: Infrared   SpO2: 96%   Weight: 63.5 kg (140 lb)   Height: 162.6 cm (64.02\")      Body mass index is 24.02 kg/m².    Physical Exam   Constitutional: She is oriented to person, place, and time. She appears well-developed and well-nourished. No distress.   Eyes: Pupils are equal, round, and reactive to light. Conjunctivae and EOM are normal.   Neck: Normal range of motion.   Cardiovascular: Normal rate and regular rhythm.   No murmur heard.  Pulmonary/Chest: Effort normal. She has no wheezes.   Musculoskeletal: Normal range of motion. She exhibits no edema.    negative grind test of right thumb Bilateral hands and wrists nontender to palpation,with normal strength and tone negative Finkelstein's testing negative Phalen's testing mildly positive Tinel's testing at wrist at right radial nerve.   Neurological: She is alert and oriented to person, place, and time.   Skin: Skin is warm and dry.   Psychiatric: She has a normal mood and affect.   Nursing note and vitals reviewed.      Lab Results   Component Value Date    GLU 81 06/30/2020    BUN 12 06/30/2020    CREATININE 0.85 06/30/2020    EGFRIFNONA 72 06/30/2020    EGFRIFAFRI 83 06/30/2020     06/30/2020    K 4.1 06/30/2020     06/30/2020    CALCIUM 9.7 06/30/2020    ALBUMIN 4.7 06/30/2020    BILITOT 1.2 06/30/2020    " ALKPHOS 77 06/30/2020    AST 20 06/30/2020    ALT 20 06/30/2020    CHLPL 179 06/30/2020    TRIG 172 (H) 06/30/2020    HDL 55 06/30/2020    VLDL 34 06/30/2020    LDL 90 06/30/2020    WBC 5.4 06/30/2020    RBC 5.17 06/30/2020    HCT 46.6 06/30/2020    MCV 90 06/30/2020    MCH 29.2 06/30/2020    TSH 0.371 (L) 06/30/2020    FREET4 2.24 (H) 06/30/2020      No results found for: HGBA1C   MAMMO SCREENING DIGITAL TOMOSYNTHESIS BILATERAL W CAD-     Date of Exam: 2/27/2020 9:22 AM     Indication: Screening.  No breast complaints. The patient's had a  history of a right breast cancer treated with lumpectomy, radiation, and  chemotherapy..     Comparison: 11/19/2018.     Technique: MLO and CC views of bilateral breast(s) were obtained  according to routine screening breast mammography protocol with  tomosynthesis.       The mammographic images were interpreted with the assistance of a  computer aided detection system.      FINDINGS:  There are scattered areas of fibroglandular density. There are no new  suspicious masses, suspicious microcalcifications, or architectural  distortion in either breast. There is architectural distortion within  the posterior one third depth of the inferior aspect of the right  breast. There is associated skin thickening and skin retraction. This is  consistent with posttreatment changes.     IMPRESSION:  Benign findings. No mammographic evidence of malignancy. Recommend  routine mammographic screening.     BI-RADS ASSESSMENT: BI-RADS 2. Benign findings.     The patient's information is entered into a computerized reminder system  with a targeted due date for the next mammogram.     Note:  It has been reported that there is approximately a 15% false  negative in mammography.  Therefore, management of a palpable  abnormality should not be deferred because of a negative mammogram.       Procedures     Assessment/Plan   Diagnoses and all orders for this visit:    1. Acquired hypothyroidism (Primary)  -      TSH; Future  -     T4, free; Future    2. Dyslipidemia  -     Lipid Panel; Future    3. Chemotherapy-induced peripheral neuropathy (CMS/HCC)    4. Palpitations      Hypothyroidism, was slightly overtreated, have reducee levothyroxine to 112 mcg daily, will repeat labs in 6 to 12 weeks.  Hyperlipidemia greatly improved, now at goal, continue Crestor.    There are no discontinued medications.       Return in about 3 months (around 10/2/2020) for initial Medicare Wellness with EKG and thyroid labs.  Patient has filled out her Medicare wellness form and is leaving here to be used for future appointment.

## 2020-08-07 DIAGNOSIS — G47.00 INSOMNIA, UNSPECIFIED TYPE: ICD-10-CM

## 2020-08-07 RX ORDER — ZOLPIDEM TARTRATE 10 MG/1
TABLET ORAL
Qty: 30 TABLET | Refills: 3 | Status: SHIPPED | OUTPATIENT
Start: 2020-08-07 | End: 2021-03-16 | Stop reason: SDUPTHER

## 2020-09-23 RX ORDER — ROSUVASTATIN CALCIUM 20 MG/1
TABLET, COATED ORAL
Qty: 90 TABLET | Refills: 2 | Status: SHIPPED | OUTPATIENT
Start: 2020-09-23 | End: 2021-03-16

## 2020-10-01 ENCOUNTER — RESULTS ENCOUNTER (OUTPATIENT)
Dept: FAMILY MEDICINE CLINIC | Facility: CLINIC | Age: 65
End: 2020-10-01

## 2020-10-01 DIAGNOSIS — E03.9 ACQUIRED HYPOTHYROIDISM: ICD-10-CM

## 2020-10-01 DIAGNOSIS — E78.5 DYSLIPIDEMIA: ICD-10-CM

## 2020-10-03 LAB
CHOLEST SERPL-MCNC: 198 MG/DL (ref 100–199)
HDLC SERPL-MCNC: 58 MG/DL
LDLC SERPL CALC-MCNC: 106 MG/DL (ref 0–99)
T4 FREE SERPL-MCNC: 2.01 NG/DL (ref 0.82–1.77)
TRIGL SERPL-MCNC: 196 MG/DL (ref 0–149)
TSH SERPL DL<=0.005 MIU/L-ACNC: 0.72 UIU/ML (ref 0.45–4.5)
VLDLC SERPL CALC-MCNC: 34 MG/DL (ref 5–40)

## 2020-10-27 DIAGNOSIS — E03.9 ACQUIRED HYPOTHYROIDISM: ICD-10-CM

## 2020-10-27 RX ORDER — LEVOTHYROXINE SODIUM 112 UG/1
112 TABLET ORAL DAILY
Qty: 90 TABLET | Refills: 3 | Status: SHIPPED | OUTPATIENT
Start: 2020-10-27 | End: 2021-03-16

## 2021-03-03 ENCOUNTER — TRANSCRIBE ORDERS (OUTPATIENT)
Dept: ADMINISTRATIVE | Facility: HOSPITAL | Age: 66
End: 2021-03-03

## 2021-03-03 DIAGNOSIS — Z12.31 SCREENING MAMMOGRAM FOR HIGH-RISK PATIENT: Primary | ICD-10-CM

## 2021-03-03 DIAGNOSIS — N95.1 CLIMACTERIC: ICD-10-CM

## 2021-03-16 ENCOUNTER — OFFICE VISIT (OUTPATIENT)
Dept: FAMILY MEDICINE CLINIC | Facility: CLINIC | Age: 66
End: 2021-03-16

## 2021-03-16 VITALS
DIASTOLIC BLOOD PRESSURE: 79 MMHG | WEIGHT: 141.8 LBS | BODY MASS INDEX: 24.21 KG/M2 | HEART RATE: 77 BPM | RESPIRATION RATE: 16 BRPM | HEIGHT: 64 IN | TEMPERATURE: 97.8 F | SYSTOLIC BLOOD PRESSURE: 112 MMHG | OXYGEN SATURATION: 98 %

## 2021-03-16 DIAGNOSIS — E03.9 ACQUIRED HYPOTHYROIDISM: ICD-10-CM

## 2021-03-16 DIAGNOSIS — G47.00 INSOMNIA, UNSPECIFIED TYPE: ICD-10-CM

## 2021-03-16 DIAGNOSIS — E78.5 DYSLIPIDEMIA: Primary | ICD-10-CM

## 2021-03-16 DIAGNOSIS — Z11.59 ENCOUNTER FOR HEPATITIS C SCREENING TEST FOR LOW RISK PATIENT: ICD-10-CM

## 2021-03-16 PROCEDURE — 99214 OFFICE O/P EST MOD 30 MIN: CPT | Performed by: FAMILY MEDICINE

## 2021-03-16 RX ORDER — ROSUVASTATIN CALCIUM 20 MG/1
20 TABLET, COATED ORAL DAILY
Qty: 90 TABLET | Refills: 3 | Status: SHIPPED | OUTPATIENT
Start: 2021-03-16 | End: 2021-12-13

## 2021-03-16 RX ORDER — LEVOTHYROXINE SODIUM 112 UG/1
112 TABLET ORAL DAILY
Qty: 90 TABLET | Refills: 3 | Status: SHIPPED | OUTPATIENT
Start: 2021-03-16 | End: 2021-12-13

## 2021-03-16 RX ORDER — ZOLPIDEM TARTRATE 10 MG/1
TABLET ORAL
Qty: 30 TABLET | Refills: 3 | Status: SHIPPED | OUTPATIENT
Start: 2021-03-16 | End: 2021-07-19

## 2021-03-16 NOTE — PROGRESS NOTES
"Chief Complaint  Hypothyroidism, Hyperlipidemia, and Insomnia    Subjective          History of Present Illness  Anastasiya Terrell presents to [facility] for follow up on her cholesterol, hypothyroidism, and insomnia. Patient takes her medications as prescribed. Patient states her medications seem to be working with no concerns. Patient is fasting for labs.   Still need to take Ambien every night but only 1/3 of a 10 mg tablet.     Patient is scheduled for mammogram and DEXA scan on May 19, (6 weeks after 2nd covid shot) ordered by Dr Jacobs, did PAP, reports normal March 1st.     Next covid vaccination is scheduled March 30th.    Have a lot of stomach grumbling after eating. No pain or diarrhea or bowel changes, no reflux or heartburn or other GI symptoms.     Thumb     Current Outpatient Medications on File Prior to Visit   Medication Sig   • [DISCONTINUED] levothyroxine (SYNTHROID, LEVOTHROID) 112 MCG tablet Take 1 tablet by mouth Daily.   • [DISCONTINUED] rosuvastatin (CRESTOR) 20 MG tablet TAKE ONE TABLET BY MOUTH EVERY DAY   • [DISCONTINUED] zolpidem (AMBIEN) 10 MG tablet TAKE 1/2 TO 1 TABLET BY MOUTH AT BEDTIME     No current facility-administered medications on file prior to visit.       Objective   Vital Signs:   /79 (BP Location: Left arm, Patient Position: Sitting, Cuff Size: Adult)   Pulse 77   Temp 97.8 °F (36.6 °C) (Infrared)   Resp 16   Ht 162.6 cm (64.02\")   Wt 64.3 kg (141 lb 12.8 oz)   SpO2 98%   BMI 24.32 kg/m²     Physical Exam  Vitals and nursing note reviewed.   Constitutional:       General: She is not in acute distress.     Appearance: She is well-developed.   HENT:      Head: Normocephalic and atraumatic.   Cardiovascular:      Rate and Rhythm: Normal rate and regular rhythm.      Heart sounds: No murmur.   Pulmonary:      Effort: Pulmonary effort is normal.      Breath sounds: Normal breath sounds. No wheezing.   Abdominal:      General: Abdomen is flat. " There is no distension.      Palpations: Abdomen is soft. There is no mass.      Tenderness: There is no abdominal tenderness. There is no guarding or rebound.   Musculoskeletal:         General: Normal range of motion.   Skin:     General: Skin is warm and dry.      Findings: No rash.   Neurological:      Mental Status: She is alert and oriented to person, place, and time.                No results found for: HGBA1C    Result Review :     CMP    CMP 6/30/20   Glucose 81   BUN 12   Creatinine 0.85   eGFR Non  Am 72   eGFR  Am 83   Sodium 143   Potassium 4.1   Chloride 104   Calcium 9.7   Total Protein 7.2   Albumin 4.7   Globulin 2.5   Total Bilirubin 1.2   Alkaline Phosphatase 77   AST (SGOT) 20   ALT (SGPT) 20           CBC w/diff    CBC w/Diff 6/30/20   WBC 5.4   RBC 5.17   Hemoglobin 15.1   Hematocrit 46.6   MCV 90   MCH 29.2   MCHC 32.4   RDW 12.8   Platelets 239   Neutrophil Rel % 53   Lymphocyte Rel % 36   Monocyte Rel % 8   Eosinophil Rel % 2   Basophil Rel % 1           Lipid Panel    Lipid Panel 6/30/20 10/2/20   Total Cholesterol 179 198   Triglycerides 172 (A) 196 (A)   HDL Cholesterol 55 58   VLDL Cholesterol 34 34   LDL Cholesterol  90 106 (A)   (A) Abnormal value            TSH    TSH 6/30/20 10/2/20   TSH 0.371 (A) 0.724   (A) Abnormal value                              Assessment and Plan    Diagnoses and all orders for this visit:    1. Dyslipidemia (Primary)  -     rosuvastatin (CRESTOR) 20 MG tablet; Take 1 tablet by mouth Daily.  Dispense: 90 tablet; Refill: 3  -     Comprehensive Metabolic Panel  -     Lipid Panel    2. Insomnia, unspecified type  -     zolpidem (AMBIEN) 10 MG tablet; TAKE 1/2 TO 1 TABLET BY MOUTH AT BEDTIME  Dispense: 30 tablet; Refill: 3    3. Acquired hypothyroidism  -     levothyroxine (SYNTHROID, LEVOTHROID) 112 MCG tablet; Take 1 tablet by mouth Daily.  Dispense: 90 tablet; Refill: 3  -     TSH  -     T4, Free  -     T3, Free  -     CBC & Differential    4.  Encounter for hepatitis C screening test for low risk patient  -     Hepatitis C Antibody    Hypothyroidism and hyperlipidemia doing well with current medications checking levels today to ensure dose does not need to be adjusted.  Renewing medication but asking her not to  until results have been reported in case dosing changes need to occur.  Insomnia very well controlled with one third Ambien daily, prescription given.  Preventative health, she needs to return for Medicare wellness, mammogram and DEXA scan as already ordered and will obtain recent Pap from Dr. Jacobs.  Increase gastric sounds without additional symptoms, advised could try a low FODMAP diet and reduce caffeine.  She has been advised if symptoms increase Or new symptoms develop need to contact office for further evaluation.  Medications Discontinued During This Encounter   Medication Reason   • zolpidem (AMBIEN) 10 MG tablet Reorder   • rosuvastatin (CRESTOR) 20 MG tablet    • levothyroxine (SYNTHROID, LEVOTHROID) 112 MCG tablet          Follow Up     Return in about 4 weeks (around 4/13/2021) for Medicare Wellness.    Patient was given instructions and counseling regarding her condition or for health maintenance advice. Please see specific information pulled into the AVS if appropriate.

## 2021-03-17 LAB
ALBUMIN SERPL-MCNC: 4.6 G/DL (ref 3.8–4.8)
ALBUMIN/GLOB SERPL: 1.7 {RATIO} (ref 1.2–2.2)
ALP SERPL-CCNC: 96 IU/L (ref 39–117)
ALT SERPL-CCNC: 19 IU/L (ref 0–32)
AST SERPL-CCNC: 22 IU/L (ref 0–40)
BASOPHILS # BLD AUTO: 0.1 X10E3/UL (ref 0–0.2)
BASOPHILS NFR BLD AUTO: 1 %
BILIRUB SERPL-MCNC: 1.2 MG/DL (ref 0–1.2)
BUN SERPL-MCNC: 10 MG/DL (ref 8–27)
BUN/CREAT SERPL: 10 (ref 12–28)
CALCIUM SERPL-MCNC: 9.9 MG/DL (ref 8.7–10.3)
CHLORIDE SERPL-SCNC: 102 MMOL/L (ref 96–106)
CHOLEST SERPL-MCNC: 209 MG/DL (ref 100–199)
CO2 SERPL-SCNC: 25 MMOL/L (ref 20–29)
CREAT SERPL-MCNC: 0.97 MG/DL (ref 0.57–1)
EOSINOPHIL # BLD AUTO: 0.2 X10E3/UL (ref 0–0.4)
EOSINOPHIL NFR BLD AUTO: 3 %
ERYTHROCYTE [DISTWIDTH] IN BLOOD BY AUTOMATED COUNT: 13 % (ref 11.7–15.4)
GLOBULIN SER CALC-MCNC: 2.7 G/DL (ref 1.5–4.5)
GLUCOSE SERPL-MCNC: 82 MG/DL (ref 65–99)
HCT VFR BLD AUTO: 46.9 % (ref 34–46.6)
HCV AB S/CO SERPL IA: <0.1 S/CO RATIO (ref 0–0.9)
HDLC SERPL-MCNC: 59 MG/DL
HGB BLD-MCNC: 15.4 G/DL (ref 11.1–15.9)
IMM GRANULOCYTES # BLD AUTO: 0 X10E3/UL (ref 0–0.1)
IMM GRANULOCYTES NFR BLD AUTO: 0 %
LDLC SERPL CALC-MCNC: 113 MG/DL (ref 0–99)
LYMPHOCYTES # BLD AUTO: 1.9 X10E3/UL (ref 0.7–3.1)
LYMPHOCYTES NFR BLD AUTO: 32 %
MCH RBC QN AUTO: 29.7 PG (ref 26.6–33)
MCHC RBC AUTO-ENTMCNC: 32.8 G/DL (ref 31.5–35.7)
MCV RBC AUTO: 91 FL (ref 79–97)
MONOCYTES # BLD AUTO: 0.5 X10E3/UL (ref 0.1–0.9)
MONOCYTES NFR BLD AUTO: 8 %
NEUTROPHILS # BLD AUTO: 3.2 X10E3/UL (ref 1.4–7)
NEUTROPHILS NFR BLD AUTO: 56 %
PLATELET # BLD AUTO: 273 X10E3/UL (ref 150–450)
POTASSIUM SERPL-SCNC: 4.4 MMOL/L (ref 3.5–5.2)
PROT SERPL-MCNC: 7.3 G/DL (ref 6–8.5)
RBC # BLD AUTO: 5.18 X10E6/UL (ref 3.77–5.28)
SODIUM SERPL-SCNC: 142 MMOL/L (ref 134–144)
T3FREE SERPL-MCNC: 3.1 PG/ML (ref 2–4.4)
T4 FREE SERPL-MCNC: 2 NG/DL (ref 0.82–1.77)
TRIGL SERPL-MCNC: 217 MG/DL (ref 0–149)
TSH SERPL DL<=0.005 MIU/L-ACNC: 0.42 UIU/ML (ref 0.45–4.5)
VLDLC SERPL CALC-MCNC: 37 MG/DL (ref 5–40)
WBC # BLD AUTO: 5.9 X10E3/UL (ref 3.4–10.8)

## 2021-04-21 ENCOUNTER — OFFICE VISIT (OUTPATIENT)
Dept: FAMILY MEDICINE CLINIC | Facility: CLINIC | Age: 66
End: 2021-04-21

## 2021-04-21 VITALS
DIASTOLIC BLOOD PRESSURE: 77 MMHG | RESPIRATION RATE: 16 BRPM | TEMPERATURE: 97.3 F | BODY MASS INDEX: 24.65 KG/M2 | WEIGHT: 144.4 LBS | SYSTOLIC BLOOD PRESSURE: 123 MMHG | HEIGHT: 64 IN | OXYGEN SATURATION: 97 % | HEART RATE: 88 BPM

## 2021-04-21 DIAGNOSIS — E03.9 ACQUIRED HYPOTHYROIDISM: ICD-10-CM

## 2021-04-21 DIAGNOSIS — R71.8 ELEVATED HEMATOCRIT: ICD-10-CM

## 2021-04-21 DIAGNOSIS — Z00.00 MEDICARE ANNUAL WELLNESS VISIT, INITIAL: Primary | ICD-10-CM

## 2021-04-21 PROCEDURE — G0438 PPPS, INITIAL VISIT: HCPCS | Performed by: FAMILY MEDICINE

## 2021-04-21 PROCEDURE — 99213 OFFICE O/P EST LOW 20 MIN: CPT | Performed by: FAMILY MEDICINE

## 2021-04-21 RX ORDER — MELATONIN
1000 3 TIMES WEEKLY
COMMUNITY

## 2021-04-21 NOTE — PROGRESS NOTES
The ABCs of the Annual Wellness Visit  Initial Medicare Wellness Visit    Chief Complaint   Patient presents with   • Medicare Wellness-Initial Visit       Subjective   History of Present Illness:  Anastasiya Terrell is a 65 y.o. female who presents for an Initial Medicare Wellness Visit.    Hypothyroidism, levothyroxine decreased to 112 mcg from 125 a year and a half ago, TSH in overtreated range  on 2021, non change at that time.      Did have 2nd covid vaccination , did have fever, bocy aches and rash.     Mammogram 20 BI RAD 2 BENIGN, this years is scheduled  May 19th 2021  Dexa Scan 10/25/17  Osteopenia.  Repeating this year, scheduled with mammogram  Pap smear 3/1/21 negative, Dr. Jacobs  Colonoscopy 2016, nonadenomatous polyps repeat in 10 years    Patient denies concerns or questions today.     ATTENTION  What is the year: correct  What is the month of the year: correct  What is the day of the week?: correct  What is the date?: correct  MEMORY  Repeat address three times, only score third attempt: Don Alonso [address] [place] [address]: 7 (7)  HOW MANY ANIMALS DID THE PATIENT NAME  Verbal Fluency -- Animal Names (0-25): 22+  CLOCK DRAWING  Clock Drawing: All Correct  MEMORY RECALL  Tell me what you remember about that name and address we were repeating at the beginnin  ACE TOTAL SCORE  Total ACE Score - <25/30 strongly suggests cognitive impairment; <21/30 almost certainly shows dementia: 30      HEALTH RISK ASSESSMENT    Recent Hospitalizations:  No hospitalization(s) within the last year.    Current Medical Providers:  Patient Care Team:  Yani Arias MD as PCP - General (Family Medicine)    Smoking Status:  Social History     Tobacco Use   Smoking Status Never Smoker   Smokeless Tobacco Never Used       Alcohol Consumption:  Social History     Substance and Sexual Activity   Alcohol Use Not Currently       Depression Screen:   PHQ-2/PHQ-9 Depression  Screening 4/21/2021   Little interest or pleasure in doing things 0   Feeling down, depressed, or hopeless 0   Total Score 0       Fall Risk Screen:  ASHLEIGH Fall Risk Assessment was completed, and patient is at LOW risk for falls.Assessment completed on:4/21/2021    Health Habits and Functional and Cognitive Screening:  Functional & Cognitive Status 4/21/2021   Do you have difficulty preparing food and eating? No   Do you have difficulty bathing yourself, getting dressed or grooming yourself? No   Do you have difficulty using the toilet? No   Do you have difficulty moving around from place to place? No   Do you have trouble with steps or getting out of a bed or a chair? No   Current Diet Limited Junk Food   Dental Exam Up to date   Eye Exam Not up to date   Exercise (times per week) 7 times per week   Current Exercise Activities Include Walking   Do you need help using the phone?  No   Are you deaf or do you have serious difficulty hearing?  No   Do you need help with transportation? No   Do you need help shopping? No   Do you need help preparing meals?  No   Do you need help with housework?  No   Do you need help with laundry? No   Do you need help taking your medications? No   Do you need help managing money? No   Do you ever drive or ride in a car without wearing a seat belt? No   Have you felt unusual stress, anger or loneliness in the last month? Yes   Who do you live with? Spouse   If you need help, do you have trouble finding someone available to you? No   Have you been bothered in the last four weeks by sexual problems? No   Do you have difficulty concentrating, remembering or making decisions? Yes         Does the patient have evidence of cognitive impairment? No    Asprin use counseling:Does not need ASA (and currently is not on it)    Age-appropriate Screening Schedule:  Refer to the list below for future screening recommendations based on patient's age, sex and/or medical conditions. Orders for these  recommended tests are listed in the plan section. The patient has been provided with a written plan.    Health Maintenance   Topic Date Due   • ZOSTER VACCINE (1 of 2) Never done   • DXA SCAN  10/30/2019   • MAMMOGRAM  02/27/2021   • INFLUENZA VACCINE  08/01/2021   • TDAP/TD VACCINES (2 - Td) 03/10/2022   • LIPID PANEL  03/16/2022   • PAP SMEAR  03/01/2024   • COLONOSCOPY  11/16/2026          The following portions of the patient's history were reviewed and updated as appropriate: allergies, current medications, past family history, past medical history, past social history, past surgical history and problem list.    Outpatient Medications Prior to Visit   Medication Sig Dispense Refill   • cholecalciferol (VITAMIN D3) 25 MCG (1000 UT) tablet Take 1,000 Units by mouth 3 (Three) Times a Week.     • levothyroxine (SYNTHROID, LEVOTHROID) 112 MCG tablet Take 1 tablet by mouth Daily. 90 tablet 3   • rosuvastatin (CRESTOR) 20 MG tablet Take 1 tablet by mouth Daily. 90 tablet 3   • zolpidem (AMBIEN) 10 MG tablet TAKE 1/2 TO 1 TABLET BY MOUTH AT BEDTIME 30 tablet 3     No facility-administered medications prior to visit.       Patient Active Problem List   Diagnosis   • Hypothyroidism   • Carcinoma of breast (CMS/HCC)   • Dyslipidemia   • Eczema   • Hypercalcemia   • Insomnia   • Chemotherapy-induced peripheral neuropathy (CMS/HCC)   • Palpitations       Advanced Care Planning:  ACP discussion was held with the patient during this visit. Patient does not have an advance directive, information provided.    Review of Systems    Compared to one year ago, the patient feels her physical health is the same.  Compared to one year ago, the patient feels her mental health is the same.    Reviewed chart for potential of high risk medication in the elderly: yes  Reviewed chart for potential of harmful drug interactions in the elderly:yes    Objective         Vitals:    04/21/21 1011   BP: 123/77   BP Location: Left arm   Patient  "Position: Sitting   Cuff Size: Adult   Pulse: 88   Resp: 16   Temp: 97.3 °F (36.3 °C)   TempSrc: Infrared   SpO2: 97%   Weight: 65.5 kg (144 lb 6.4 oz)   Height: 162.6 cm (64.02\")   PainSc: 0-No pain       Body mass index is 24.77 kg/m².  Discussed the patient's BMI with her. The BMI is in the acceptable range.    Physical Exam  Vitals and nursing note reviewed.   Constitutional:       General: She is not in acute distress.     Appearance: She is well-developed.   HENT:      Head: Normocephalic and atraumatic.   Neck:      Comments: No thyromegaly or palpable thyroid nodules  Cardiovascular:      Rate and Rhythm: Normal rate and regular rhythm.      Heart sounds: No murmur heard.     Pulmonary:      Effort: Pulmonary effort is normal.      Breath sounds: Normal breath sounds. No wheezing.   Musculoskeletal:         General: Normal range of motion.      Cervical back: Normal range of motion and neck supple. No rigidity.   Skin:     General: Skin is warm and dry.      Findings: No rash.   Neurological:      Mental Status: She is alert and oriented to person, place, and time.         Lab Results   Component Value Date    GLU 82 03/16/2021    CHLPL 209 (H) 03/16/2021    TRIG 217 (H) 03/16/2021    HDL 59 03/16/2021     (H) 03/16/2021    VLDL 37 03/16/2021        Assessment/Plan   Medicare Risks and Personalized Health Plan  CMS Preventative Services Quick Reference  Advance Directive Discussion  Breast Cancer/Mammogram Screening  Colon Cancer Screening  Immunizations Discussed/Encouraged (specific immunizations; Pneumococcal 23, Shingrix and COVID19 )  Osteoporosis Risk    The above risks/problems have been discussed with the patient.  Pertinent information has been shared with the patient in the After Visit Summary.  Follow up plans and orders are seen below in the Assessment/Plan Section.    Diagnoses and all orders for this visit:    1. Medicare annual wellness visit, initial (Primary)    2. Elevated " hematocrit  -     CBC & Differential    3. Acquired hypothyroidism  -     TSH  -     T4, Free  -     T3, Free    Medicare wellness, patient declines zoster due to cost and wants to postpone Pneumovax due to recent Covid vaccination she will be getting mammogram and DEXA scan next month.    Hypothyroidism recent lab indicated overtreated, rechecking now and if remaining in overtreated range will reduce levothyroxine from 112 to 100 mcg and follow labs as needed.      Mild elevation in hematocrit with completely normal CBC otherwise.  Patient is not a smoker and was not dehydrated at time of recent labs.  Patient does report her son has a similar issue which has actually required phlebotomy.  Rechecking CBC now to make sure his not increased.  Follow Up:  Return in about 1 year (around 4/21/2022) for Medicare Wellness and thyroid  or as needed.     An After Visit Summary and PPPS were given to the patient.

## 2021-04-22 LAB
BASOPHILS # BLD AUTO: 0.1 X10E3/UL (ref 0–0.2)
BASOPHILS NFR BLD AUTO: 1 %
EOSINOPHIL # BLD AUTO: 0.2 X10E3/UL (ref 0–0.4)
EOSINOPHIL NFR BLD AUTO: 3 %
ERYTHROCYTE [DISTWIDTH] IN BLOOD BY AUTOMATED COUNT: 12.6 % (ref 11.7–15.4)
HCT VFR BLD AUTO: 44.2 % (ref 34–46.6)
HGB BLD-MCNC: 15.3 G/DL (ref 11.1–15.9)
IMM GRANULOCYTES # BLD AUTO: 0 X10E3/UL (ref 0–0.1)
IMM GRANULOCYTES NFR BLD AUTO: 0 %
LYMPHOCYTES # BLD AUTO: 1.9 X10E3/UL (ref 0.7–3.1)
LYMPHOCYTES NFR BLD AUTO: 39 %
MCH RBC QN AUTO: 30.4 PG (ref 26.6–33)
MCHC RBC AUTO-ENTMCNC: 34.6 G/DL (ref 31.5–35.7)
MCV RBC AUTO: 88 FL (ref 79–97)
MONOCYTES # BLD AUTO: 0.4 X10E3/UL (ref 0.1–0.9)
MONOCYTES NFR BLD AUTO: 9 %
NEUTROPHILS # BLD AUTO: 2.3 X10E3/UL (ref 1.4–7)
NEUTROPHILS NFR BLD AUTO: 48 %
PLATELET # BLD AUTO: 253 X10E3/UL (ref 150–450)
RBC # BLD AUTO: 5.04 X10E6/UL (ref 3.77–5.28)
T3FREE SERPL-MCNC: 2.9 PG/ML (ref 2–4.4)
T4 FREE SERPL-MCNC: 1.97 NG/DL (ref 0.82–1.77)
TSH SERPL DL<=0.005 MIU/L-ACNC: 0.57 UIU/ML (ref 0.45–4.5)
WBC # BLD AUTO: 4.8 X10E3/UL (ref 3.4–10.8)

## 2021-04-23 ENCOUNTER — TELEPHONE (OUTPATIENT)
Dept: FAMILY MEDICINE CLINIC | Facility: CLINIC | Age: 66
End: 2021-04-23

## 2021-04-23 NOTE — TELEPHONE ENCOUNTER
HUB TO READ    LEFT MESSAGE     Please inform patient T4 is essentially the same, slightly elevated however her TSH has returned back into normal range.  We can either leave levothyroxine dose the same or reduce from 112-100 as discussed at office visit.  Either way I would recommend repeating labs again in about 3 months

## 2021-05-19 ENCOUNTER — HOSPITAL ENCOUNTER (OUTPATIENT)
Dept: BONE DENSITY | Facility: HOSPITAL | Age: 66
Discharge: HOME | End: 2021-05-19

## 2021-05-19 ENCOUNTER — HOSPITAL ENCOUNTER (OUTPATIENT)
Dept: MAMMOGRAPHY | Facility: HOSPITAL | Age: 66
Discharge: HOME | End: 2021-05-19

## 2021-05-19 DIAGNOSIS — Z12.31 SCREENING MAMMOGRAM FOR HIGH-RISK PATIENT: ICD-10-CM

## 2021-05-19 DIAGNOSIS — N95.1 CLIMACTERIC: ICD-10-CM

## 2021-05-19 PROCEDURE — 77080 DXA BONE DENSITY AXIAL: CPT

## 2021-05-19 PROCEDURE — 77067 SCR MAMMO BI INCL CAD: CPT

## 2021-05-19 PROCEDURE — 77063 BREAST TOMOSYNTHESIS BI: CPT

## 2021-07-19 DIAGNOSIS — G47.00 INSOMNIA, UNSPECIFIED TYPE: ICD-10-CM

## 2021-07-19 RX ORDER — ZOLPIDEM TARTRATE 10 MG/1
TABLET ORAL
Qty: 30 TABLET | Refills: 3 | Status: SHIPPED | OUTPATIENT
Start: 2021-07-19 | End: 2021-12-13

## 2021-12-13 DIAGNOSIS — G47.00 INSOMNIA, UNSPECIFIED TYPE: ICD-10-CM

## 2021-12-13 DIAGNOSIS — E03.9 ACQUIRED HYPOTHYROIDISM: ICD-10-CM

## 2021-12-13 DIAGNOSIS — E78.5 DYSLIPIDEMIA: ICD-10-CM

## 2021-12-13 RX ORDER — LEVOTHYROXINE SODIUM 112 UG/1
TABLET ORAL
Qty: 90 TABLET | Refills: 1 | Status: SHIPPED | OUTPATIENT
Start: 2021-12-13 | End: 2022-03-31 | Stop reason: SDUPTHER

## 2021-12-13 RX ORDER — ROSUVASTATIN CALCIUM 20 MG/1
20 TABLET, COATED ORAL DAILY
Qty: 90 TABLET | Refills: 1 | Status: SHIPPED | OUTPATIENT
Start: 2021-12-13 | End: 2022-03-31 | Stop reason: SDUPTHER

## 2021-12-13 RX ORDER — ZOLPIDEM TARTRATE 10 MG/1
TABLET ORAL
Qty: 30 TABLET | Refills: 3 | Status: SHIPPED | OUTPATIENT
Start: 2021-12-13 | End: 2022-07-08

## 2021-12-13 NOTE — TELEPHONE ENCOUNTER
Please inform patient I am renewing medications as written quested however I do not see upcoming appointment when I saw her in April I asked her to return in about 1 year (around 4/21/2022) for Medicare Wellness and follow-up on thyroid.  Please have her schedule this at this time.

## 2022-03-31 ENCOUNTER — OFFICE VISIT (OUTPATIENT)
Dept: FAMILY MEDICINE CLINIC | Facility: CLINIC | Age: 67
End: 2022-03-31

## 2022-03-31 VITALS
WEIGHT: 145.8 LBS | OXYGEN SATURATION: 97 % | BODY MASS INDEX: 24.89 KG/M2 | SYSTOLIC BLOOD PRESSURE: 112 MMHG | DIASTOLIC BLOOD PRESSURE: 78 MMHG | RESPIRATION RATE: 16 BRPM | HEIGHT: 64 IN | HEART RATE: 74 BPM | TEMPERATURE: 96.9 F

## 2022-03-31 DIAGNOSIS — E55.9 VITAMIN D DEFICIENCY: Primary | ICD-10-CM

## 2022-03-31 DIAGNOSIS — E78.5 DYSLIPIDEMIA: ICD-10-CM

## 2022-03-31 DIAGNOSIS — E03.9 ACQUIRED HYPOTHYROIDISM: ICD-10-CM

## 2022-03-31 DIAGNOSIS — Z00.00 PREVENTATIVE HEALTH CARE: ICD-10-CM

## 2022-03-31 DIAGNOSIS — Z12.31 OTHER SCREENING MAMMOGRAM: ICD-10-CM

## 2022-03-31 PROCEDURE — 99213 OFFICE O/P EST LOW 20 MIN: CPT | Performed by: NURSE PRACTITIONER

## 2022-03-31 RX ORDER — ROSUVASTATIN CALCIUM 20 MG/1
20 TABLET, COATED ORAL DAILY
Qty: 90 TABLET | Refills: 1 | Status: SHIPPED | OUTPATIENT
Start: 2022-03-31 | End: 2022-07-08

## 2022-03-31 RX ORDER — LEVOTHYROXINE SODIUM 112 UG/1
112 TABLET ORAL DAILY
Qty: 90 TABLET | Refills: 1 | Status: SHIPPED | OUTPATIENT
Start: 2022-03-31 | End: 2022-07-08

## 2022-03-31 RX ORDER — ALENDRONATE SODIUM 70 MG/1
70 TABLET ORAL WEEKLY
COMMUNITY
Start: 2022-03-16 | End: 2023-01-03 | Stop reason: SDUPTHER

## 2022-03-31 NOTE — PROGRESS NOTES
"    Anastasiya Terrell is a 66 y.o. female.     66-year-old white female with history of hypothyroidism, insomnia, vitamin D3 deficiency, breast cancer, hyperlipidemia and chemo-induced peripheral neuropathy who comes in today to reestablish care and for annual physical.    Blood pressure 112/78 heart rate 74 she denies any chest pain, dyspnea, tachycardia or dizziness    Patient states her right eye turn cloudy for a few hours and then returned to normal she does have an eye appointment tomorrow    Weight is up 2 pounds at 146 with a BMI 25.  Patient is up-to-date on COVID vaccines has eye exam tomorrow he is up-to-date on DEXA scan and I am scheduling her mammogram at [place].  Patient had a colonoscopy in 2016 which stated repeat in 5 years biopsy of polyp was adenomatous.  Unfortunately there was no pathology sent.  The patient's history of cancer I recommend she call and find out whether she needs a colonoscopy now or in 2026          Fasting blood work  Call [facility] about colonoscopy  Keep eye appointment tomorrow  Mammogram           The following portions of the patient's history were reviewed and updated as appropriate: allergies, current medications, past family history, past medical history, past social history, past surgical history and problem list.    Vitals:    03/31/22 0931   BP: 112/78   BP Location: Right arm   Patient Position: Sitting   Cuff Size: Adult   Pulse: 74   Resp: 16   Temp: 96.9 °F (36.1 °C)   TempSrc: Infrared   SpO2: 97%   Weight: 66.1 kg (145 lb 12.8 oz)   Height: 162.6 cm (64\")     Body mass index is 25.03 kg/m².    Past Medical History:   Diagnosis Date   • Acute bronchitis    • Breast cancer (HCC) 2005    Right breast   • Chronic insomnia    • Drug therapy 2005    Right breast cancer   • Dyslipidemia    • Eczema    • Hypercalcemia    • Hyperlipidemia    • Hypothyroidism    • Metastatic adenocarcinoma to breast (HCC)    • Other abnormality of red blood cells    • Polycythemia     Mild "     Past Surgical History:   Procedure Laterality Date   • BASAL CELL CARCINOMA EXCISION     • BREAST BIOPSY Right 2005   • BREAST LUMPECTOMY Right 2005   • VENOUS ACCESS DEVICE (PORT) INSERTION       Family History   Problem Relation Age of Onset   • Hyperlipidemia Mother    • Coronary artery disease Father    • Heart attack Father 62     Immunization History   Administered Date(s) Administered   • COVID-19 (MODERNA) 1st, 2nd, 3rd Dose Only 03/02/2021, 03/30/2021   • COVID-19 (MODERNA) BOOSTER 03/02/2021, 03/30/2021, 12/17/2021   • Hep A / Hep B 06/15/2018   • Hepatitis A 12/21/2018   • Tdap 03/10/2012       Office Visit on 04/21/2021   Component Date Value Ref Range Status   • TSH 04/21/2021 0.569  0.450 - 4.500 uIU/mL Final   • Free T4 04/21/2021 1.97 (A) 0.82 - 1.77 ng/dL Final   • T3, Free 04/21/2021 2.9  2.0 - 4.4 pg/mL Final   • WBC 04/21/2021 4.8  3.4 - 10.8 x10E3/uL Final   • RBC 04/21/2021 5.04  3.77 - 5.28 x10E6/uL Final   • Hemoglobin 04/21/2021 15.3  11.1 - 15.9 g/dL Final   • Hematocrit 04/21/2021 44.2  34.0 - 46.6 % Final   • MCV 04/21/2021 88  79 - 97 fL Final   • MCH 04/21/2021 30.4  26.6 - 33.0 pg Final   • MCHC 04/21/2021 34.6  31.5 - 35.7 g/dL Final   • RDW 04/21/2021 12.6  11.7 - 15.4 % Final   • Platelets 04/21/2021 253  150 - 450 x10E3/uL Final   • Neutrophil Rel % 04/21/2021 48  Not Estab. % Final   • Lymphocyte Rel % 04/21/2021 39  Not Estab. % Final   • Monocyte Rel % 04/21/2021 9  Not Estab. % Final   • Eosinophil Rel % 04/21/2021 3  Not Estab. % Final   • Basophil Rel % 04/21/2021 1  Not Estab. % Final   • Neutrophils Absolute 04/21/2021 2.3  1.4 - 7.0 x10E3/uL Final   • Lymphocytes Absolute 04/21/2021 1.9  0.7 - 3.1 x10E3/uL Final   • Monocytes Absolute 04/21/2021 0.4  0.1 - 0.9 x10E3/uL Final   • Eosinophils Absolute 04/21/2021 0.2  0.0 - 0.4 x10E3/uL Final   • Basophils Absolute 04/21/2021 0.1  0.0 - 0.2 x10E3/uL Final   • Immature Granulocyte Rel % 04/21/2021 0  Not Estab. % Final   •  Immature Grans Absolute 04/21/2021 0.0  0.0 - 0.1 x10E3/uL Final         Review of Systems   Constitutional: Negative.    HENT: Negative.    Eyes: Positive for blurred vision.   Respiratory: Negative.    Cardiovascular: Negative.    Gastrointestinal: Negative.    Genitourinary: Negative.    Musculoskeletal: Negative.    Skin: Negative.    Neurological: Negative.    Psychiatric/Behavioral: Negative.        Objective   Physical Exam  HENT:      Head: Normocephalic.   Cardiovascular:      Rate and Rhythm: Normal rate and regular rhythm.      Pulses: Normal pulses.      Heart sounds: Normal heart sounds.   Pulmonary:      Effort: Pulmonary effort is normal.      Breath sounds: Normal breath sounds.   Abdominal:      General: Bowel sounds are normal.   Musculoskeletal:         General: Normal range of motion.   Skin:     General: Skin is warm and dry.   Neurological:      General: No focal deficit present.      Mental Status: She is alert and oriented to person, place, and time.   Psychiatric:         Mood and Affect: Mood normal.         Behavior: Behavior normal.         Procedures    Assessment/Plan   Diagnoses and all orders for this visit:    1. Vitamin D deficiency (Primary)  -     Vitamin D 1,25 Dihydroxy    2. Dyslipidemia  -     Lipid Panel With LDL / HDL Ratio  -     rosuvastatin (CRESTOR) 20 MG tablet; Take 1 tablet by mouth Daily.  Dispense: 90 tablet; Refill: 1    3. Acquired hypothyroidism  -     TSH+Free T4  -     T3  -     levothyroxine (SYNTHROID, LEVOTHROID) 112 MCG tablet; Take 1 tablet by mouth Daily.  Dispense: 90 tablet; Refill: 1    4. Preventative health care  -     CBC & Differential  -     Comprehensive Metabolic Panel    5. BMI 25.0-25.9,adult    6. Other screening mammogram  -     Mammo Screening Digital Tomosynthesis Bilateral With CAD; Future          Current Outpatient Medications:   •  alendronate (FOSAMAX) 70 MG tablet, Take 70 mg by mouth 1 (One) Time Per Week., Disp: , Rfl:   •   cholecalciferol (VITAMIN D3) 25 MCG (1000 UT) tablet, Take 1,000 Units by mouth 3 (Three) Times a Week., Disp: , Rfl:   •  levothyroxine (SYNTHROID, LEVOTHROID) 112 MCG tablet, Take 1 tablet by mouth Daily., Disp: 90 tablet, Rfl: 1  •  rosuvastatin (CRESTOR) 20 MG tablet, Take 1 tablet by mouth Daily., Disp: 90 tablet, Rfl: 1  •  zolpidem (AMBIEN) 10 MG tablet, TAKE ONE-HALF TO ONE TABLET BY MOUTH at bedtime, Disp: 30 tablet, Rfl: 3           Norma Rodriguez, ANTONIO 3/31/2022 11:15 EDT  This note has been electronically signed

## 2022-04-01 LAB
ALBUMIN SERPL-MCNC: 4.6 G/DL (ref 3.8–4.8)
ALBUMIN/GLOB SERPL: 1.9 {RATIO} (ref 1.2–2.2)
ALP SERPL-CCNC: 87 IU/L (ref 44–121)
ALT SERPL-CCNC: 18 IU/L (ref 0–32)
AST SERPL-CCNC: 25 IU/L (ref 0–40)
BASOPHILS # BLD AUTO: 0 X10E3/UL (ref 0–0.2)
BASOPHILS NFR BLD AUTO: 1 %
BILIRUB SERPL-MCNC: 1.2 MG/DL (ref 0–1.2)
BUN SERPL-MCNC: 11 MG/DL (ref 8–27)
BUN/CREAT SERPL: 10 (ref 12–28)
CALCIUM SERPL-MCNC: 9.6 MG/DL (ref 8.7–10.3)
CHLORIDE SERPL-SCNC: 99 MMOL/L (ref 96–106)
CHOLEST SERPL-MCNC: 200 MG/DL (ref 100–199)
CO2 SERPL-SCNC: 26 MMOL/L (ref 20–29)
CREAT SERPL-MCNC: 1.06 MG/DL (ref 0.57–1)
EGFRCR SERPLBLD CKD-EPI 2021: 58 ML/MIN/1.73
EOSINOPHIL # BLD AUTO: 0.2 X10E3/UL (ref 0–0.4)
EOSINOPHIL NFR BLD AUTO: 2 %
ERYTHROCYTE [DISTWIDTH] IN BLOOD BY AUTOMATED COUNT: 13 % (ref 11.7–15.4)
GLOBULIN SER CALC-MCNC: 2.4 G/DL (ref 1.5–4.5)
GLUCOSE SERPL-MCNC: 84 MG/DL (ref 65–99)
HCT VFR BLD AUTO: 45.8 % (ref 34–46.6)
HDLC SERPL-MCNC: 56 MG/DL
HGB BLD-MCNC: 15.1 G/DL (ref 11.1–15.9)
IMM GRANULOCYTES # BLD AUTO: 0 X10E3/UL (ref 0–0.1)
IMM GRANULOCYTES NFR BLD AUTO: 0 %
LDLC SERPL CALC-MCNC: 107 MG/DL (ref 0–99)
LDLC/HDLC SERPL: 1.9 RATIO (ref 0–3.2)
LYMPHOCYTES # BLD AUTO: 2.1 X10E3/UL (ref 0.7–3.1)
LYMPHOCYTES NFR BLD AUTO: 34 %
MCH RBC QN AUTO: 29.3 PG (ref 26.6–33)
MCHC RBC AUTO-ENTMCNC: 33 G/DL (ref 31.5–35.7)
MCV RBC AUTO: 89 FL (ref 79–97)
MONOCYTES # BLD AUTO: 0.4 X10E3/UL (ref 0.1–0.9)
MONOCYTES NFR BLD AUTO: 7 %
NEUTROPHILS # BLD AUTO: 3.4 X10E3/UL (ref 1.4–7)
NEUTROPHILS NFR BLD AUTO: 56 %
PLATELET # BLD AUTO: 248 X10E3/UL (ref 150–450)
POTASSIUM SERPL-SCNC: 4.3 MMOL/L (ref 3.5–5.2)
PROT SERPL-MCNC: 7 G/DL (ref 6–8.5)
RBC # BLD AUTO: 5.15 X10E6/UL (ref 3.77–5.28)
SODIUM SERPL-SCNC: 138 MMOL/L (ref 134–144)
T3 SERPL-MCNC: 84 NG/DL (ref 71–180)
T4 FREE SERPL-MCNC: 1.86 NG/DL (ref 0.82–1.77)
TRIGL SERPL-MCNC: 216 MG/DL (ref 0–149)
TSH SERPL DL<=0.005 MIU/L-ACNC: 3.5 UIU/ML (ref 0.45–4.5)
VLDLC SERPL CALC-MCNC: 37 MG/DL (ref 5–40)
WBC # BLD AUTO: 6.2 X10E3/UL (ref 3.4–10.8)

## 2022-04-02 LAB — 1,25(OH)2D SERPL-MCNC: 62.7 PG/ML (ref 19.9–79.3)

## 2022-05-20 ENCOUNTER — HOSPITAL ENCOUNTER (OUTPATIENT)
Dept: MAMMOGRAPHY | Facility: HOSPITAL | Age: 67
Discharge: HOME | End: 2022-05-20
Admitting: NURSE PRACTITIONER

## 2022-05-20 DIAGNOSIS — Z12.31 OTHER SCREENING MAMMOGRAM: ICD-10-CM

## 2022-05-20 PROCEDURE — 77063 BREAST TOMOSYNTHESIS BI: CPT

## 2022-05-20 PROCEDURE — 77067 SCR MAMMO BI INCL CAD: CPT

## 2022-06-03 DIAGNOSIS — G47.00 INSOMNIA, UNSPECIFIED TYPE: ICD-10-CM

## 2022-06-03 RX ORDER — ZOLPIDEM TARTRATE 10 MG/1
TABLET ORAL
Qty: 30 TABLET | Refills: 3 | OUTPATIENT
Start: 2022-06-03

## 2022-07-08 DIAGNOSIS — E03.9 ACQUIRED HYPOTHYROIDISM: ICD-10-CM

## 2022-07-08 DIAGNOSIS — E78.5 DYSLIPIDEMIA: ICD-10-CM

## 2022-07-08 DIAGNOSIS — G47.00 INSOMNIA, UNSPECIFIED TYPE: ICD-10-CM

## 2022-07-08 RX ORDER — ZOLPIDEM TARTRATE 10 MG/1
TABLET ORAL
Qty: 30 TABLET | Refills: 0 | Status: SHIPPED | OUTPATIENT
Start: 2022-07-08 | End: 2022-11-01

## 2022-07-08 RX ORDER — ROSUVASTATIN CALCIUM 20 MG/1
TABLET, COATED ORAL
Qty: 90 TABLET | Refills: 1 | Status: SHIPPED | OUTPATIENT
Start: 2022-07-08 | End: 2023-01-03 | Stop reason: SDUPTHER

## 2022-07-08 RX ORDER — LEVOTHYROXINE SODIUM 112 UG/1
TABLET ORAL
Qty: 90 TABLET | Refills: 1 | Status: SHIPPED | OUTPATIENT
Start: 2022-07-08 | End: 2023-01-05

## 2022-09-28 ENCOUNTER — TELEPHONE (OUTPATIENT)
Dept: FAMILY MEDICINE CLINIC | Facility: CLINIC | Age: 67
End: 2022-09-28

## 2022-09-28 NOTE — TELEPHONE ENCOUNTER
Please tell Anastasiya that I have sent in the Paxlovid for her.  I also recommend to minimize interactions, to skip rosuvastatin on the 5 days that she is on the Paxlovid.  Hope this helps her feel better.  Thanks

## 2022-09-28 NOTE — TELEPHONE ENCOUNTER
Caller: Anastasiya Terrell    Relationship: Self    Best call back number: [phone]    What medication are you requesting: PAXLOVID     What are your current symptoms: SINUS CONGESTION, COUGH, WATERY EYES, LOW GRADE FEVER     If a prescription is needed, what is your preferred pharmacy and phone number: [address] - [phone]  - [phone]      Additional notes: PATIENT TESTED POSITIVE FOR COVID ON 09.27.22 AND IS REQUESTING MEDICATION, POSSIBLY PAXLOVID

## 2022-11-01 DIAGNOSIS — G47.00 INSOMNIA, UNSPECIFIED TYPE: ICD-10-CM

## 2022-11-01 RX ORDER — ZOLPIDEM TARTRATE 10 MG/1
TABLET ORAL
Qty: 30 TABLET | Refills: 0 | Status: SHIPPED | OUTPATIENT
Start: 2022-11-01 | End: 2023-01-03 | Stop reason: SDUPTHER

## 2023-01-03 ENCOUNTER — OFFICE VISIT (OUTPATIENT)
Dept: FAMILY MEDICINE CLINIC | Facility: CLINIC | Age: 68
End: 2023-01-03
Payer: MEDICARE

## 2023-01-03 VITALS
OXYGEN SATURATION: 98 % | TEMPERATURE: 97.1 F | HEIGHT: 64 IN | BODY MASS INDEX: 25.03 KG/M2 | DIASTOLIC BLOOD PRESSURE: 73 MMHG | SYSTOLIC BLOOD PRESSURE: 105 MMHG | HEART RATE: 85 BPM | WEIGHT: 146.6 LBS

## 2023-01-03 DIAGNOSIS — E03.9 ACQUIRED HYPOTHYROIDISM: Primary | ICD-10-CM

## 2023-01-03 DIAGNOSIS — C50.911 CARCINOMA OF RIGHT FEMALE BREAST, UNSPECIFIED ESTROGEN RECEPTOR STATUS, UNSPECIFIED SITE OF BREAST: ICD-10-CM

## 2023-01-03 DIAGNOSIS — M79.89 SWELLING OF RIGHT UPPER EXTREMITY: ICD-10-CM

## 2023-01-03 DIAGNOSIS — E83.52 HYPERCALCEMIA: ICD-10-CM

## 2023-01-03 DIAGNOSIS — G47.00 INSOMNIA, UNSPECIFIED TYPE: ICD-10-CM

## 2023-01-03 DIAGNOSIS — E78.5 DYSLIPIDEMIA: ICD-10-CM

## 2023-01-03 DIAGNOSIS — Z00.00 PREVENTATIVE HEALTH CARE: ICD-10-CM

## 2023-01-03 DIAGNOSIS — R59.9 SWELLING OF LYMPH NODE: ICD-10-CM

## 2023-01-03 PROCEDURE — 99213 OFFICE O/P EST LOW 20 MIN: CPT | Performed by: NURSE PRACTITIONER

## 2023-01-03 PROCEDURE — 1160F RVW MEDS BY RX/DR IN RCRD: CPT | Performed by: NURSE PRACTITIONER

## 2023-01-03 PROCEDURE — 1159F MED LIST DOCD IN RCRD: CPT | Performed by: NURSE PRACTITIONER

## 2023-01-03 RX ORDER — ROSUVASTATIN CALCIUM 20 MG/1
20 TABLET, COATED ORAL DAILY
Qty: 90 TABLET | Refills: 1 | Status: SHIPPED | OUTPATIENT
Start: 2023-01-03

## 2023-01-03 RX ORDER — ALENDRONATE SODIUM 70 MG/1
70 TABLET ORAL WEEKLY
Qty: 4 TABLET | Refills: 2 | Status: SHIPPED | OUTPATIENT
Start: 2023-01-03 | End: 2023-03-09

## 2023-01-03 RX ORDER — ZOLPIDEM TARTRATE 10 MG/1
TABLET ORAL
Qty: 30 TABLET | Refills: 0 | Status: SHIPPED | OUTPATIENT
Start: 2023-01-03 | End: 2023-03-09

## 2023-01-03 NOTE — PATIENT INSTRUCTIONS
Contacted pt's  ( verbal reviewed) & relayed PCP's message.  He verbalized understanding and requested the referral go to [facility], I informed him of the referral process and will relay preference to referral coordinator.    Right axillary and right upper extremity ultrasound  Fasting blood work  Mammogram and DEXA scan in spring  Schedule colonoscopy this spring  Follow-up 6 months

## 2023-01-03 NOTE — PROGRESS NOTES
Anastasiya Terrell is a 67 y.o. female.     History of Present Illness  67-year-old white female with history of hypothyroidism, insomnia, vitamin D3 deficiency, breast cancer, hyperlipidemia chemo induced peripheral neuropathy who comes in today for 6-month follow-up visit fasting blood work    Blood pressure 104/72 heart rate 84 with moderate systolic murmur she denies any chest pain, dyspnea, tachycardia or dizziness    Patient has history of breast cancer and right breast with lymph node involvement and she had a lobectomy done and some lymph nodes removed in the right axillary.  She states on Christmas day her arm started swelling and lymph nodes are enlarged under her right arm.  She states that she changed very little since then I am going to get ultrasound of axillary and right upper extremity.  She denies any pain numbness or tingling    Weight is 147 BMI 25.2.  She has not been vaccinated for influenza but did have 3 COVID vaccines up-to-date on eye exam mammogram is due in May 2023 along with her DEXA scan.  Her colonoscopy was June 2021 but patient thought it was 10 years to have a 5-year.  We are going to schedule this in the spring as well              Right axillary and right upper extremity ultrasound  Fasting blood work  Mammogram and DEXA scan in spring  Schedule colonoscopy this spring  Follow-up 6 months           The following portions of the patient's history were reviewed and updated as appropriate: allergies, current medications, past family history, past medical history, past social history, past surgical history and problem list.    Vitals:    01/03/23 1312   BP: 105/73   BP Location: Left arm   Patient Position: Sitting   Cuff Size: Adult   Pulse: 85   Temp: 97.1 °F (36.2 °C)   TempSrc: Temporal   SpO2: 98%   Weight: 66.5 kg (146 lb 9.6 oz)   Height: 162.6 cm (64\")     Body mass index is 25.16 kg/m².    Past Medical History:   Diagnosis Date   • Acute bronchitis    • Breast cancer (HCC)  2005    Right breast   • Chronic insomnia    • Drug therapy 2005    Right breast cancer   • Dyslipidemia    • Eczema    • Hypercalcemia    • Hyperlipidemia    • Hypothyroidism    • Metastatic adenocarcinoma to breast (HCC)    • Other abnormality of red blood cells    • Polycythemia     Mild     Past Surgical History:   Procedure Laterality Date   • BASAL CELL CARCINOMA EXCISION     • BREAST BIOPSY Right 2005   • BREAST LUMPECTOMY Right 2005   • VENOUS ACCESS DEVICE (PORT) INSERTION       Family History   Problem Relation Age of Onset   • Hyperlipidemia Mother    • Coronary artery disease Father    • Heart attack Father 62   • Hyperlipidemia Father      Immunization History   Administered Date(s) Administered   • COVID-19 (MODERNA) 1st, 2nd, 3rd Dose Only 03/02/2021, 03/30/2021   • COVID-19 (MODERNA) BOOSTER 03/02/2021, 03/30/2021, 12/17/2021   • Hep A / Hep B 06/15/2018   • Hepatitis A 12/21/2018   • Tdap 03/10/2012       Office Visit on 03/31/2022   Component Date Value Ref Range Status   • WBC 03/31/2022 6.2  3.4 - 10.8 x10E3/uL Final   • RBC 03/31/2022 5.15  3.77 - 5.28 x10E6/uL Final   • Hemoglobin 03/31/2022 15.1  11.1 - 15.9 g/dL Final   • Hematocrit 03/31/2022 45.8  34.0 - 46.6 % Final   • MCV 03/31/2022 89  79 - 97 fL Final   • MCH 03/31/2022 29.3  26.6 - 33.0 pg Final   • MCHC 03/31/2022 33.0  31.5 - 35.7 g/dL Final   • RDW 03/31/2022 13.0  11.7 - 15.4 % Final   • Platelets 03/31/2022 248  150 - 450 x10E3/uL Final   • Neutrophil Rel % 03/31/2022 56  Not Estab. % Final   • Lymphocyte Rel % 03/31/2022 34  Not Estab. % Final   • Monocyte Rel % 03/31/2022 7  Not Estab. % Final   • Eosinophil Rel % 03/31/2022 2  Not Estab. % Final   • Basophil Rel % 03/31/2022 1  Not Estab. % Final   • Neutrophils Absolute 03/31/2022 3.4  1.4 - 7.0 x10E3/uL Final   • Lymphocytes Absolute 03/31/2022 2.1  0.7 - 3.1 x10E3/uL Final   • Monocytes Absolute 03/31/2022 0.4  0.1 - 0.9 x10E3/uL Final   • Eosinophils Absolute 03/31/2022  0.2  0.0 - 0.4 x10E3/uL Final   • Basophils Absolute 03/31/2022 0.0  0.0 - 0.2 x10E3/uL Final   • Immature Granulocyte Rel % 03/31/2022 0  Not Estab. % Final   • Immature Grans Absolute 03/31/2022 0.0  0.0 - 0.1 x10E3/uL Final   • Glucose 03/31/2022 84  65 - 99 mg/dL Final   • BUN 03/31/2022 11  8 - 27 mg/dL Final   • Creatinine 03/31/2022 1.06 (H)  0.57 - 1.00 mg/dL Final   • EGFR Result 03/31/2022 58 (L)  >59 mL/min/1.73 Final   • BUN/Creatinine Ratio 03/31/2022 10 (L)  12 - 28 Final   • Sodium 03/31/2022 138  134 - 144 mmol/L Final   • Potassium 03/31/2022 4.3  3.5 - 5.2 mmol/L Final   • Chloride 03/31/2022 99  96 - 106 mmol/L Final   • Total CO2 03/31/2022 26  20 - 29 mmol/L Final   • Calcium 03/31/2022 9.6  8.7 - 10.3 mg/dL Final   • Total Protein 03/31/2022 7.0  6.0 - 8.5 g/dL Final   • Albumin 03/31/2022 4.6  3.8 - 4.8 g/dL Final   • Globulin 03/31/2022 2.4  1.5 - 4.5 g/dL Final   • A/G Ratio 03/31/2022 1.9  1.2 - 2.2 Final   • Total Bilirubin 03/31/2022 1.2  0.0 - 1.2 mg/dL Final   • Alkaline Phosphatase 03/31/2022 87  44 - 121 IU/L Final   • AST (SGOT) 03/31/2022 25  0 - 40 IU/L Final   • ALT (SGPT) 03/31/2022 18  0 - 32 IU/L Final   • Total Cholesterol 03/31/2022 200 (H)  100 - 199 mg/dL Final   • Triglycerides 03/31/2022 216 (H)  0 - 149 mg/dL Final   • HDL Cholesterol 03/31/2022 56  >39 mg/dL Final   • VLDL Cholesterol Leroy 03/31/2022 37  5 - 40 mg/dL Final   • LDL Chol Calc ([facility]) 03/31/2022 107 (H)  0 - 99 mg/dL Final   • LDL/HDL RATIO 03/31/2022 1.9  0.0 - 3.2 ratio Final    Comment:                                     LDL/HDL Ratio                                              Men  Women                                1/2 Avg.Risk  1.0    1.5                                    Avg.Risk  3.6    3.2                                 2X Avg.Risk  6.2    5.0                                 3X Avg.Risk  8.0    6.1     • TSH 03/31/2022 3.500  0.450 - 4.500 uIU/mL Final   • Free T4 03/31/2022 1.86 (H)  0.82 -  1.77 ng/dL Final   • T3, Total 03/31/2022 84  71 - 180 ng/dL Final   • 1,25-Dihydroxy, Vitamin D 03/31/2022 62.7  19.9 - 79.3 pg/mL Final         Review of Systems   Constitutional: Negative.    HENT: Negative.    Cardiovascular: Negative.    Gastrointestinal: Negative.    Genitourinary: Negative.    Musculoskeletal:        Right upper extremity swollen   Skin: Negative.    Neurological: Negative.    Psychiatric/Behavioral: Negative.        Objective   Physical Exam  Constitutional:       Appearance: Normal appearance.   HENT:      Head: Normocephalic.   Cardiovascular:      Rate and Rhythm: Normal rate and regular rhythm.      Pulses: Normal pulses.      Heart sounds: Normal heart sounds.   Pulmonary:      Effort: Pulmonary effort is normal.      Breath sounds: Normal breath sounds.   Abdominal:      General: Bowel sounds are normal.   Skin:     General: Skin is warm and dry.   Neurological:      General: No focal deficit present.      Mental Status: She is alert and oriented to person, place, and time.   Psychiatric:         Mood and Affect: Mood normal.         Behavior: Behavior normal.         Procedures    Assessment & Plan   Diagnoses and all orders for this visit:    1. Acquired hypothyroidism (Primary)  -     TSH+Free T4; Future  -     T3; Future    2. Dyslipidemia  -     Lipid Panel With LDL / HDL Ratio; Future  -     rosuvastatin (CRESTOR) 20 MG tablet; Take 1 tablet by mouth Daily.  Dispense: 90 tablet; Refill: 1    3. Hypercalcemia  -     Comprehensive Metabolic Panel; Future    4. Preventative health care  -     CBC & Differential; Future    5. Insomnia, unspecified type  -     zolpidem (AMBIEN) 10 MG tablet; TAKE 1/2 TO 1 TABLET BY MOUTH AT BEDTIME  Dispense: 30 tablet; Refill: 0    6. Swelling of right upper extremity  -     US Venous Doppler Upper Extremity Left (duplex); Future    7. Swelling of lymph node  -     US Axilla Right; Future    8. BMI 25.0-25.9,adult    9. Carcinoma of right female  breast, unspecified estrogen receptor status, unspecified site of breast (HCC)    Other orders  -     alendronate (FOSAMAX) 70 MG tablet; Take 1 tablet by mouth 1 (One) Time Per Week.  Dispense: 4 tablet; Refill: 2          Current Outpatient Medications:   •  alendronate (FOSAMAX) 70 MG tablet, Take 1 tablet by mouth 1 (One) Time Per Week., Disp: 4 tablet, Rfl: 2  •  cholecalciferol (VITAMIN D3) 25 MCG (1000 UT) tablet, Take 1,000 Units by mouth 3 (Three) Times a Week., Disp: , Rfl:   •  levothyroxine (SYNTHROID, LEVOTHROID) 112 MCG tablet, TAKE ONE TABLET BY MOUTH EVERY DAY, Disp: 90 tablet, Rfl: 1  •  rosuvastatin (CRESTOR) 20 MG tablet, Take 1 tablet by mouth Daily., Disp: 90 tablet, Rfl: 1  •  zolpidem (AMBIEN) 10 MG tablet, TAKE 1/2 TO 1 TABLET BY MOUTH AT BEDTIME, Disp: 30 tablet, Rfl: 0           ANTONIO Nunez 1/3/2023 14:30 EST  This note has been electronically signed

## 2023-01-05 LAB
ALBUMIN SERPL-MCNC: 4.6 G/DL (ref 3.8–4.8)
ALBUMIN/GLOB SERPL: 2 {RATIO} (ref 1.2–2.2)
ALP SERPL-CCNC: 80 IU/L (ref 44–121)
ALT SERPL-CCNC: 22 IU/L (ref 0–32)
AST SERPL-CCNC: 23 IU/L (ref 0–40)
BASOPHILS # BLD AUTO: 0.1 X10E3/UL (ref 0–0.2)
BASOPHILS NFR BLD AUTO: 1 %
BILIRUB SERPL-MCNC: 1.1 MG/DL (ref 0–1.2)
BUN SERPL-MCNC: 10 MG/DL (ref 8–27)
BUN/CREAT SERPL: 11 (ref 12–28)
CALCIUM SERPL-MCNC: 9.8 MG/DL (ref 8.7–10.3)
CHLORIDE SERPL-SCNC: 103 MMOL/L (ref 96–106)
CHOLEST SERPL-MCNC: 237 MG/DL (ref 100–199)
CO2 SERPL-SCNC: 26 MMOL/L (ref 20–29)
CREAT SERPL-MCNC: 0.92 MG/DL (ref 0.57–1)
EGFRCR SERPLBLD CKD-EPI 2021: 68 ML/MIN/1.73
EOSINOPHIL # BLD AUTO: 0.2 X10E3/UL (ref 0–0.4)
EOSINOPHIL NFR BLD AUTO: 4 %
ERYTHROCYTE [DISTWIDTH] IN BLOOD BY AUTOMATED COUNT: 13 % (ref 11.7–15.4)
GLOBULIN SER CALC-MCNC: 2.3 G/DL (ref 1.5–4.5)
GLUCOSE SERPL-MCNC: 88 MG/DL (ref 70–99)
HCT VFR BLD AUTO: 48 % (ref 34–46.6)
HDLC SERPL-MCNC: 55 MG/DL
HGB BLD-MCNC: 15.8 G/DL (ref 11.1–15.9)
IMM GRANULOCYTES # BLD AUTO: 0 X10E3/UL (ref 0–0.1)
IMM GRANULOCYTES NFR BLD AUTO: 0 %
LDLC SERPL CALC-MCNC: 147 MG/DL (ref 0–99)
LDLC/HDLC SERPL: 2.7 RATIO (ref 0–3.2)
LYMPHOCYTES # BLD AUTO: 1.9 X10E3/UL (ref 0.7–3.1)
LYMPHOCYTES NFR BLD AUTO: 35 %
MCH RBC QN AUTO: 30 PG (ref 26.6–33)
MCHC RBC AUTO-ENTMCNC: 32.9 G/DL (ref 31.5–35.7)
MCV RBC AUTO: 91 FL (ref 79–97)
MONOCYTES # BLD AUTO: 0.4 X10E3/UL (ref 0.1–0.9)
MONOCYTES NFR BLD AUTO: 8 %
NEUTROPHILS # BLD AUTO: 2.8 X10E3/UL (ref 1.4–7)
NEUTROPHILS NFR BLD AUTO: 52 %
PLATELET # BLD AUTO: 294 X10E3/UL (ref 150–450)
POTASSIUM SERPL-SCNC: 4.4 MMOL/L (ref 3.5–5.2)
PROT SERPL-MCNC: 6.9 G/DL (ref 6–8.5)
RBC # BLD AUTO: 5.27 X10E6/UL (ref 3.77–5.28)
SODIUM SERPL-SCNC: 143 MMOL/L (ref 134–144)
T3 SERPL-MCNC: 85 NG/DL (ref 71–180)
T4 FREE SERPL-MCNC: 1.66 NG/DL (ref 0.82–1.77)
TRIGL SERPL-MCNC: 196 MG/DL (ref 0–149)
TSH SERPL DL<=0.005 MIU/L-ACNC: 5.81 UIU/ML (ref 0.45–4.5)
VLDLC SERPL CALC-MCNC: 35 MG/DL (ref 5–40)
WBC # BLD AUTO: 5.5 X10E3/UL (ref 3.4–10.8)

## 2023-01-05 RX ORDER — LEVOTHYROXINE SODIUM 0.12 MG/1
125 TABLET ORAL DAILY
Qty: 90 TABLET | Refills: 0 | Status: SHIPPED | OUTPATIENT
Start: 2023-01-05

## 2023-01-06 DIAGNOSIS — R59.9 SWELLING OF LYMPH NODE: ICD-10-CM

## 2023-01-06 DIAGNOSIS — M79.89 SWELLING OF RIGHT UPPER EXTREMITY: ICD-10-CM

## 2023-01-11 ENCOUNTER — TELEPHONE (OUTPATIENT)
Dept: FAMILY MEDICINE CLINIC | Facility: CLINIC | Age: 68
End: 2023-01-11
Payer: MEDICARE

## 2023-01-11 NOTE — TELEPHONE ENCOUNTER
Spoke with Ashlee Booth.  She said she combined the studies so she would not be charged twice.  She said she scanned the Axilla and did not see anything abnormal.  SG

## 2023-01-16 RX ORDER — PREDNISONE 10 MG/1
TABLET ORAL
Qty: 19 TABLET | Refills: 0 | Status: SHIPPED | OUTPATIENT
Start: 2023-01-16 | End: 2023-01-28

## 2023-01-16 NOTE — TELEPHONE ENCOUNTER
HUB ATTEMPTED TO WARM TRANSFER AND WAS UNSUCCESSFUL    PATIENT IS NEEDING A CALL BACK WHEN AVAILABLE.

## 2023-01-26 ENCOUNTER — TELEPHONE (OUTPATIENT)
Dept: FAMILY MEDICINE CLINIC | Facility: CLINIC | Age: 68
End: 2023-01-26
Payer: MEDICARE

## 2023-01-26 NOTE — TELEPHONE ENCOUNTER
Caller: Anastasiya Terrell    Relationship: Self    Best call back number: [phone]    What is the best time to reach you: AFTERNOON    Who are you requesting to speak with (clinical staff, provider,  specific staff member): CLINICAL STAFF    What was the call regarding: PATIENT STATES THAT THE predniSONE (DELTASONE) 10 MG tablet DID NOT WORK FOR HER ARM SWELLING. PATIENT IS REQUESTING A CALL BACK TO BE ADVISED OF NEXT STEP. PATIENT STATES THAT IF ANTHONY MARTINEZ WANTS HER TO SEE AN ONCOLOGIST, HER DOCTOR IS DALTON SHIELDS    Do you require a callback: YES

## 2023-02-27 ENCOUNTER — TRANSCRIBE ORDERS (OUTPATIENT)
Dept: ADMINISTRATIVE | Facility: HOSPITAL | Age: 68
End: 2023-02-27
Payer: MEDICARE

## 2023-02-27 DIAGNOSIS — C50.919 MALIGNANT NEOPLASM OF FEMALE BREAST, UNSPECIFIED ESTROGEN RECEPTOR STATUS, UNSPECIFIED LATERALITY, UNSPECIFIED SITE OF BREAST: Primary | ICD-10-CM

## 2023-03-06 ENCOUNTER — HOSPITAL ENCOUNTER (OUTPATIENT)
Dept: ULTRASOUND IMAGING | Facility: HOSPITAL | Age: 68
Discharge: HOME | End: 2023-03-06
Payer: MEDICARE

## 2023-03-06 ENCOUNTER — HOSPITAL ENCOUNTER (OUTPATIENT)
Dept: MAMMOGRAPHY | Facility: HOSPITAL | Age: 68
Discharge: HOME | End: 2023-03-06
Payer: MEDICARE

## 2023-03-06 DIAGNOSIS — C50.919 MALIGNANT NEOPLASM OF FEMALE BREAST, UNSPECIFIED ESTROGEN RECEPTOR STATUS, UNSPECIFIED LATERALITY, UNSPECIFIED SITE OF BREAST: ICD-10-CM

## 2023-03-06 DIAGNOSIS — Z85.3 HISTORY OF BREAST CANCER: ICD-10-CM

## 2023-03-06 PROCEDURE — 76882 US LMTD JT/FCL EVL NVASC XTR: CPT

## 2023-03-06 PROCEDURE — 77066 DX MAMMO INCL CAD BI: CPT

## 2023-03-06 PROCEDURE — G0279 TOMOSYNTHESIS, MAMMO: HCPCS

## 2023-03-08 DIAGNOSIS — G47.00 INSOMNIA, UNSPECIFIED TYPE: ICD-10-CM

## 2023-03-09 RX ORDER — ALENDRONATE SODIUM 70 MG/1
TABLET ORAL
Qty: 4 TABLET | Refills: 2 | Status: SHIPPED | OUTPATIENT
Start: 2023-03-09

## 2023-03-09 RX ORDER — ZOLPIDEM TARTRATE 10 MG/1
TABLET ORAL
Qty: 30 TABLET | Refills: 0 | Status: SHIPPED | OUTPATIENT
Start: 2023-03-09

## 2023-04-18 ENCOUNTER — TELEPHONE (OUTPATIENT)
Dept: FAMILY MEDICINE CLINIC | Facility: CLINIC | Age: 68
End: 2023-04-18
Payer: MEDICARE

## 2023-04-18 DIAGNOSIS — Z78.0 POSTMENOPAUSAL: Primary | ICD-10-CM

## 2023-04-27 ENCOUNTER — TREATMENT (OUTPATIENT)
Dept: PHYSICAL THERAPY | Facility: CLINIC | Age: 68
End: 2023-04-27
Payer: MEDICARE

## 2023-04-27 DIAGNOSIS — C50.911 MALIGNANT NEOPLASM OF RIGHT FEMALE BREAST, UNSPECIFIED ESTROGEN RECEPTOR STATUS, UNSPECIFIED SITE OF BREAST: ICD-10-CM

## 2023-04-27 DIAGNOSIS — I89.0 LYMPHEDEMA OF RIGHT ARM: Primary | ICD-10-CM

## 2023-04-27 PROCEDURE — 97535 SELF CARE MNGMENT TRAINING: CPT | Performed by: PHYSICAL THERAPIST

## 2023-04-27 PROCEDURE — 97161 PT EVAL LOW COMPLEX 20 MIN: CPT | Performed by: PHYSICAL THERAPIST

## 2023-04-27 NOTE — PROGRESS NOTES
Physical Therapy Initial Evaluation and Plan of Care    Patient: Anastasiya Terrell   : 1955  Diagnosis/ICD-10 Code:  Lymphedema of right arm [I89.0]  Referring practitioner: Daniel Vora MD  Date of Initial Visit: 2023  Today's Date: 2023  Patient seen for 1 sessions           Subjective Questionnaire: QuickDASH: 2.2%      Subjective Evaluation    History of Present Illness  Mechanism of injury: Pt is referred to therapy due to R UE lymphedema. Diagnosed with R breast ca in . She underwent lumpectomy and lymphnodes removed. Chemotherapy. She developed lymphedema on hailey day. She had US and several other tests to check for infection and blood clot or the recurrent ca.     Onset of symptoms: hailey day 2022    Aggravating factors: denies any pain or any functional limitation. Reports the swelling remains the same no matter what she does.    Functional limitation: she is careful with use of R UE but she is not limited               Patient Occupation: retired Quality of life: good    Pain  No pain reported    Social Support  Lives with: spouse    Patient Goals  Patient goals for therapy: decreased edema           Precautions: lymphedema precautions    Objective          Functional Assessment     Comments  Obs: mild swelling noted in R UE, no redness or discoloration    Palp: R arm feels tight, mostly around the elbow and FA, no TTP, no fibrosis    ROM: wfl     Strength: wfl    Circumferential measurements: In cm    Hand: R 18, L 17  Wrist: R 15, L 14.5  Forearm: R 25, L 22  Elbow: R 25.5, L 22.5  Upper arm: mid way btw elbow and axilla: R 27.5, L 24.5                Assessment & Plan     Assessment  Impairments: activity intolerance and lacks appropriate home exercise program  Functional Limitations: carrying objects, lifting and unable to perform repetitive tasks  Assessment details: Pt is a 67 y.o. female referred to therapy due to R UE lymphedema. She is s/ R breast ca , Diagnosed in  2005. She is s/p lumpectomy and chemotherapy. She developed lymphedema in R arm on hailey day.  Pt presents with mild swelling in R UE. Upon initial evaluation pt exhibits the above impairments and functional limitations. Impairments affect performing some her normal activities.   Pt is a good candidate for rehab and will benefit from skilled physical therapy to address the swelling in R UE and maximize function.      Pt may be a good candidate for a compression pump for home use for self management of her chronic condition.   Prognosis: good    Goals  Plan Goals: STGs: in 4 weeks    1- Initiate manual lymph drainage massage and compression bandaging if indicated  2- Pt to be instructed in HEP to reduce swelling / improve ROM and improve lymphatic flow   3- Reduce swelling by 1 cm   4- Pt will report 25 % improvement in functional mobility       LTGs: by DC     1- Pt will be independent with self management of her condition   2- Reduce swelling by  1.5 cm   3- Pt will be fitted with proper compression garment and will be independent with donning/ doffing ( if compression garment is indicated )  4- Pt will report 75 % improvement and pain reduction  5- Pt will voice readiness for DC with independent program        Plan  Therapy options: will be seen for skilled therapy services  Planned therapy interventions: compression, functional ROM exercises, home exercise program, manual therapy, neuromuscular re-education, postural training, strengthening and therapeutic activities  Frequency: 2x week  Duration in weeks: 12  Treatment plan discussed with: patient  Plan details: Discussed Dx/ prognosis/ MLD/ precautions. Discussed compression sleeve and compression pump for home use if indicated.         See flow sheet for treatment detail    History # of Personal Factors and/or Comorbidities: LOW (0)  Examination of Body System(s): # of elements: LOW (1-2)  Clinical Presentation: STABLE   Clinical Decision Making: LOW            Timed:         Manual Therapy:         mins  08105;     Therapeutic Exercise:         mins  78198;     Neuromuscular Francia:        mins  97886;    Therapeutic Activity:          mins  52000;     Gait Training:           mins  53118;     Ultrasound:          mins  83507;    Ionto                                   mins   64497  Self Care                     15       mins   92907        Un-Timed:  Electrical Stimulation:         mins  53944 ( );  Dry Needling          mins self-pay  Traction          mins 04677  Canal repositioning           mins    67456  Low Eval    30      Mins  76137  Mod Eval          Mins  05726  High Eval                            Mins  57747  Re-Eval                               mins  02136        Timed Treatment:  15    mins   Total Treatment:    45    mins    PT SIGNATURE: Michael Green PT, CLT  [place] License: # [id number]     DATE TREATMENT INITIATED: 4/27/2023    Initial Certification  Certification Period: 4/27/2023 thru 7/25/2023  I certify that the therapy services are furnished while this patient is under my care.  The services outlined above are required by this patient, and will be reviewed every 90 days.     PHYSICIAN: Daniel Vora MD   NPI: [id number]                                      DATE:     Please sign and return via fax to [phone].. Thank you, [facility].

## 2023-05-09 ENCOUNTER — TREATMENT (OUTPATIENT)
Dept: PHYSICAL THERAPY | Facility: CLINIC | Age: 68
End: 2023-05-09
Payer: MEDICARE

## 2023-05-09 DIAGNOSIS — C50.911 MALIGNANT NEOPLASM OF RIGHT FEMALE BREAST, UNSPECIFIED ESTROGEN RECEPTOR STATUS, UNSPECIFIED SITE OF BREAST: ICD-10-CM

## 2023-05-09 DIAGNOSIS — I89.0 LYMPHEDEMA OF RIGHT ARM: Primary | ICD-10-CM

## 2023-05-09 PROCEDURE — 97140 MANUAL THERAPY 1/> REGIONS: CPT | Performed by: PHYSICAL THERAPIST

## 2023-05-09 PROCEDURE — 97110 THERAPEUTIC EXERCISES: CPT | Performed by: PHYSICAL THERAPIST

## 2023-05-09 NOTE — PROGRESS NOTES
Physical Therapy Daily Treatment Note    [address], [address]  [place] [address] [phone]    Patient: Anastasiya Terrell  : 1955  Referring practitioner: Daniel Vora MD  Diagnosis/ ICD10 code: Lymphedema of right arm [I89.0]  Today's Date: 2023    VISIT#: 2    Subjective   Pt reports: no changes since last visit.       Objective     See Exercise, Manual, and Modality Logs for complete treatment.  Initiated manual therapy and there ex as noted. Instructed in HEP.     Patient Education:    Assessment & Plan     Assessment    Assessment details: Pt tolerated today's rx session well.           Progress per Plan of Care            Timed:         Manual Therapy:  40       mins  97042;     Therapeutic Exercise:    15     mins  58212;     Neuromuscular Francia:        mins  84121;    Therapeutic Activity:          mins  09747;     Gait Training:           mins  62267;     Ultrasound:          mins  09091;    Ionto                                   mins   84749  Self Care                            mins   08992      Un-Timed:  Electrical Stimulation:         mins  52732 (MC );  Traction          mins 78098  Canal repositioning           mins    46906        Timed Treatment:  55    mins   Total Treatment:    55    mins    Michael Green PT, CLT  Physical Therapist  [place] License:  # [id number]

## 2023-05-10 DIAGNOSIS — G47.00 INSOMNIA, UNSPECIFIED TYPE: ICD-10-CM

## 2023-05-10 RX ORDER — LEVOTHYROXINE SODIUM 0.12 MG/1
TABLET ORAL
Qty: 90 TABLET | Refills: 0 | Status: SHIPPED | OUTPATIENT
Start: 2023-05-10

## 2023-05-10 RX ORDER — ZOLPIDEM TARTRATE 10 MG/1
TABLET ORAL
Qty: 30 TABLET | Refills: 0 | Status: SHIPPED | OUTPATIENT
Start: 2023-05-10

## 2023-05-11 ENCOUNTER — TREATMENT (OUTPATIENT)
Dept: PHYSICAL THERAPY | Facility: CLINIC | Age: 68
End: 2023-05-11
Payer: MEDICARE

## 2023-05-11 DIAGNOSIS — I89.0 LYMPHEDEMA OF RIGHT ARM: Primary | ICD-10-CM

## 2023-05-11 DIAGNOSIS — C50.911 MALIGNANT NEOPLASM OF RIGHT FEMALE BREAST, UNSPECIFIED ESTROGEN RECEPTOR STATUS, UNSPECIFIED SITE OF BREAST: ICD-10-CM

## 2023-05-11 PROCEDURE — 97110 THERAPEUTIC EXERCISES: CPT | Performed by: PHYSICAL THERAPIST

## 2023-05-11 PROCEDURE — 97140 MANUAL THERAPY 1/> REGIONS: CPT | Performed by: PHYSICAL THERAPIST

## 2023-05-11 NOTE — PROGRESS NOTES
Physical Therapy Daily Treatment Note    [address] [facility], [address]  [place] [address] [phone]    Patient: Anastasiya Terrell  : 1955  Referring practitioner: Daniel Vora MD  Diagnosis/ ICD10 code: Lymphedema of right arm [I89.0]  Today's Date: 2023    VISIT#: 3    Subjective   Pt reports: she felt really relaxed after last rx. Hasn't noticed any changes yet.       Objective     See Exercise, Manual, and Modality Logs for complete treatment.     Patient Education:    Assessment & Plan     Assessment    Assessment details: Pt tolerated today's rx session well. Demonstrates understanding of her HEP and new exercises.           Progress per Plan of Care            Timed:         Manual Therapy:   38      mins  88523;     Therapeutic Exercise:    16     mins  36789;     Neuromuscular Francia:        mins  92883;    Therapeutic Activity:          mins  30547;     Gait Training:           mins  48522;     Ultrasound:          mins  03328;    Ionto                                   mins   57185  Self Care                            mins   84191      Un-Timed:  Electrical Stimulation:         mins  88675 ( );  Traction           mins 54161  Canal repositioning           mins    20816        Timed Treatment:  54    mins   Total Treatment:     54   mins    Michael Green, PT, CLT  Physical Therapist  [place] License:  # [id number]

## 2023-05-16 ENCOUNTER — TREATMENT (OUTPATIENT)
Dept: PHYSICAL THERAPY | Facility: CLINIC | Age: 68
End: 2023-05-16
Payer: MEDICARE

## 2023-05-16 DIAGNOSIS — C50.911 MALIGNANT NEOPLASM OF RIGHT FEMALE BREAST, UNSPECIFIED ESTROGEN RECEPTOR STATUS, UNSPECIFIED SITE OF BREAST: ICD-10-CM

## 2023-05-16 DIAGNOSIS — I89.0 LYMPHEDEMA OF RIGHT ARM: Primary | ICD-10-CM

## 2023-05-16 PROCEDURE — 97140 MANUAL THERAPY 1/> REGIONS: CPT | Performed by: PHYSICAL THERAPIST

## 2023-05-16 PROCEDURE — 97110 THERAPEUTIC EXERCISES: CPT | Performed by: PHYSICAL THERAPIST

## 2023-05-16 NOTE — PROGRESS NOTES
Physical Therapy Daily Treatment Note    [facility] [facility], [address]  [place] [address] [phone]    Patient: Anastasiya Terrell  : 1955  Referring practitioner: Daniel Vora MD  Diagnosis/ ICD10 code: Lymphedema of right arm [I89.0]  Today's Date: 2023    VISIT#: 4    Subjective   Pt reports: R arm doesn't feel as tight, it feels a little better.       Objective     See Exercise, Manual, and Modality Logs for complete treatment.     Patient Education:    Assessment & Plan     Assessment    Assessment details: Pt tolerated today's rx session well.  Responding well to manual therapy and demonstrates understanding of her HEP.           Progress per Plan of Care            Timed:         Manual Therapy:   38      mins  09944;     Therapeutic Exercise:    16     mins  42839;     Neuromuscular Francia:        mins  91158;    Therapeutic Activity:          mins  71634;     Gait Training:           mins  65857;     Ultrasound:          mins  12902;    Ionto                                   mins   55565  Self Care                            mins   01485      Un-Timed:  Electrical Stimulation:         mins  68844 ( );  Traction          mins 24222  Canal repositioning           mins    49079        Timed Treatment:  54    mins   Total Treatment:     54   mins    Michael Green PT, CLT  Physical Therapist  [place] License:  # [id number]

## 2023-05-18 ENCOUNTER — TREATMENT (OUTPATIENT)
Dept: PHYSICAL THERAPY | Facility: CLINIC | Age: 68
End: 2023-05-18
Payer: MEDICARE

## 2023-05-18 DIAGNOSIS — C50.911 MALIGNANT NEOPLASM OF RIGHT FEMALE BREAST, UNSPECIFIED ESTROGEN RECEPTOR STATUS, UNSPECIFIED SITE OF BREAST: ICD-10-CM

## 2023-05-18 DIAGNOSIS — I89.0 LYMPHEDEMA OF RIGHT ARM: Primary | ICD-10-CM

## 2023-05-18 PROCEDURE — 97110 THERAPEUTIC EXERCISES: CPT | Performed by: PHYSICAL THERAPIST

## 2023-05-18 PROCEDURE — 97140 MANUAL THERAPY 1/> REGIONS: CPT | Performed by: PHYSICAL THERAPIST

## 2023-05-18 NOTE — PROGRESS NOTES
Physical Therapy Daily Treatment Note    [address] [facility], [address]  [place] [address] [phone]    Patient: Anastasiya Terrell  : 1955  Referring practitioner: Daniel Vora MD  Diagnosis/ ICD10 code: Lymphedema of right arm [I89.0]  Today's Date: 2023    VISIT#: 5    Subjective   Pt reports: doing well, R arm feels a little better. Would like some exercises for strengthen her abd ms.       Objective     See Exercise, Manual, and Modality Logs for complete treatment. Continued with there ex and manual therapy as noted. Progressed with her HEP.     Patient Education:    Assessment & Plan     Assessment    Assessment details: Pt tolerated today's rx session well. Demonstrates understanding of her HEP and new exercises.           Progress per Plan of Care            Timed:         Manual Therapy:  38       mins  18739;     Therapeutic Exercise:    16     mins  42918;     Neuromuscular Francia:        mins  87550;    Therapeutic Activity:          mins  69098;     Gait Training:           mins  05915;     Ultrasound:          mins  69779;    Ionto                                   mins   55845  Self Care                            mins   40328      Un-Timed:  Electrical Stimulation:         mins  65570 ( );  Traction          mins 47123  Canal repositioning           mins    54567        Timed Treatment:  54    mins   Total Treatment:    54    mins    Michael Green, PT, CLT  Physical Therapist  [place] License:  # [id number]

## 2023-05-22 ENCOUNTER — HOSPITAL ENCOUNTER (OUTPATIENT)
Dept: BONE DENSITY | Facility: HOSPITAL | Age: 68
Discharge: HOME | End: 2023-05-22
Admitting: NURSE PRACTITIONER
Payer: MEDICARE

## 2023-05-22 DIAGNOSIS — Z78.0 POSTMENOPAUSAL: ICD-10-CM

## 2023-05-22 PROCEDURE — 77080 DXA BONE DENSITY AXIAL: CPT

## 2023-05-23 ENCOUNTER — TREATMENT (OUTPATIENT)
Dept: PHYSICAL THERAPY | Facility: CLINIC | Age: 68
End: 2023-05-23
Payer: MEDICARE

## 2023-05-23 DIAGNOSIS — I89.0 LYMPHEDEMA OF RIGHT ARM: Primary | ICD-10-CM

## 2023-05-23 DIAGNOSIS — C50.911 MALIGNANT NEOPLASM OF RIGHT FEMALE BREAST, UNSPECIFIED ESTROGEN RECEPTOR STATUS, UNSPECIFIED SITE OF BREAST: ICD-10-CM

## 2023-05-23 PROCEDURE — 97140 MANUAL THERAPY 1/> REGIONS: CPT | Performed by: PHYSICAL THERAPIST

## 2023-05-23 PROCEDURE — 97110 THERAPEUTIC EXERCISES: CPT | Performed by: PHYSICAL THERAPIST

## 2023-05-23 NOTE — PROGRESS NOTES
Physical Therapy Daily Treatment Note    [address] [facility], [address]  [place] [address]  [phone]    Patient: Anastasiya Terrell  : 1955  Referring practitioner: Daniel Vora MD  Diagnosis/ ICD10 code: Lymphedema of right arm [I89.0]  Today's Date: 2023    VISIT#: 6    Subjective   Pt reports: doing pretty well, her arm feels better. It feels pretty good this morning feels like the swelling is down.       Objective     See Exercise, Manual, and Modality Logs for complete treatment.     Circumferential measurements: In cm     Hand: R 17  Wrist: R 15,   Forearm: R 24.5  Elbow: R 24.5  Upper arm: mid way btw elbow and axilla: R 27     Patient Education:    Assessment & Plan     Assessment    Assessment details: Pt is responding well to therapy. Decrease swelling R UE per today's measurements. Demonstrating understanding of her HEP.           Progress per Plan of Care            Timed:         Manual Therapy:   38      mins  28713;     Therapeutic Exercise:    16     mins  30193;     Neuromuscular Francia:        mins  93068;    Therapeutic Activity:          mins  89936;     Gait Training:           mins  54745;     Ultrasound:          mins  23561;    Ionto                                   mins   97739  Self Care                            mins   57022      Un-Timed:  Electrical Stimulation:         mins  92468 ( );  Traction          mins 22960  Canal repositioning           mins    11384        Timed Treatment:  54    mins   Total Treatment:    54    mins    Michael Green PT, CLT  Physical Therapist  [place] License:  # [id number]

## 2023-05-24 RX ORDER — ALENDRONATE SODIUM 70 MG/1
70 TABLET ORAL
Qty: 12 TABLET | Refills: 0 | OUTPATIENT
Start: 2023-05-24 | End: 2023-08-22

## 2023-05-25 ENCOUNTER — TREATMENT (OUTPATIENT)
Dept: PHYSICAL THERAPY | Facility: CLINIC | Age: 68
End: 2023-05-25
Payer: MEDICARE

## 2023-05-25 DIAGNOSIS — C50.911 MALIGNANT NEOPLASM OF RIGHT FEMALE BREAST, UNSPECIFIED ESTROGEN RECEPTOR STATUS, UNSPECIFIED SITE OF BREAST: ICD-10-CM

## 2023-05-25 DIAGNOSIS — I89.0 LYMPHEDEMA OF RIGHT ARM: Primary | ICD-10-CM

## 2023-05-25 PROCEDURE — 97140 MANUAL THERAPY 1/> REGIONS: CPT | Performed by: PHYSICAL THERAPIST

## 2023-05-25 PROCEDURE — 97110 THERAPEUTIC EXERCISES: CPT | Performed by: PHYSICAL THERAPIST

## 2023-05-25 NOTE — PROGRESS NOTES
Physical Therapy Daily Treatment Note    [address] [facility], [address]  [place] [address] [phone]    Patient: Anastasiya Terrell  : 1955  Referring practitioner: Daniel Vora MD  Diagnosis/ ICD10 code: Lymphedema of right arm [I89.0]  Today's Date: 2023    VISIT#: 7    Subjective   Pt reports: doing pretty well. Her arm feels better.       Objective     See Exercise, Manual, and Modality Logs for complete treatment. Continued with manual therapy and there ex as noted.     Patient Education:    Assessment & Plan     Assessment    Assessment details: Pt tolerated today's rx session well. Has responded well to therapy.           Progress per Plan of Care            Timed:         Manual Therapy:  38       mins  16633;     Therapeutic Exercise:  16       mins  34798;     Neuromuscular Francia:        mins  51900;    Therapeutic Activity:          mins  38840;     Gait Training:           mins  49348;     Ultrasound:          mins  79928;    Ionto                                   mins   15238  Self Care                            mins   04308      Un-Timed:  Electrical Stimulation:         mins  79549 ( );  Traction          mins 61246  Canal repositioning           mins    78761        Timed Treatment:  54    mins   Total Treatment:    54    mins    Michael Green, PT, CLT  Physical Therapist  [place] License:  # [id number]

## 2023-06-01 ENCOUNTER — OFFICE VISIT (OUTPATIENT)
Dept: FAMILY MEDICINE CLINIC | Facility: CLINIC | Age: 68
End: 2023-06-01

## 2023-06-01 ENCOUNTER — TREATMENT (OUTPATIENT)
Dept: PHYSICAL THERAPY | Facility: CLINIC | Age: 68
End: 2023-06-01

## 2023-06-01 VITALS
HEART RATE: 86 BPM | DIASTOLIC BLOOD PRESSURE: 78 MMHG | SYSTOLIC BLOOD PRESSURE: 120 MMHG | HEIGHT: 64 IN | WEIGHT: 146.2 LBS | TEMPERATURE: 97.3 F | OXYGEN SATURATION: 93 % | BODY MASS INDEX: 24.96 KG/M2

## 2023-06-01 DIAGNOSIS — C50.911 MALIGNANT NEOPLASM OF RIGHT FEMALE BREAST, UNSPECIFIED ESTROGEN RECEPTOR STATUS, UNSPECIFIED SITE OF BREAST: ICD-10-CM

## 2023-06-01 DIAGNOSIS — Z00.00 PREVENTATIVE HEALTH CARE: ICD-10-CM

## 2023-06-01 DIAGNOSIS — I89.0 LYMPHEDEMA OF RIGHT ARM: Primary | ICD-10-CM

## 2023-06-01 DIAGNOSIS — G47.00 INSOMNIA, UNSPECIFIED TYPE: ICD-10-CM

## 2023-06-01 DIAGNOSIS — E03.9 ACQUIRED HYPOTHYROIDISM: ICD-10-CM

## 2023-06-01 DIAGNOSIS — E78.5 DYSLIPIDEMIA: Primary | ICD-10-CM

## 2023-06-01 PROCEDURE — 97535 SELF CARE MNGMENT TRAINING: CPT | Performed by: PHYSICAL THERAPIST

## 2023-06-01 PROCEDURE — 97110 THERAPEUTIC EXERCISES: CPT | Performed by: PHYSICAL THERAPIST

## 2023-06-01 PROCEDURE — 97140 MANUAL THERAPY 1/> REGIONS: CPT | Performed by: PHYSICAL THERAPIST

## 2023-06-01 RX ORDER — ROSUVASTATIN CALCIUM 20 MG/1
20 TABLET, COATED ORAL DAILY
Qty: 90 TABLET | Refills: 1 | Status: SHIPPED | OUTPATIENT
Start: 2023-06-01

## 2023-06-01 RX ORDER — ALENDRONATE SODIUM 70 MG/1
70 TABLET ORAL
Qty: 4 TABLET | Refills: 5 | Status: SHIPPED | OUTPATIENT
Start: 2023-06-01

## 2023-06-01 RX ORDER — ZOLPIDEM TARTRATE 10 MG/1
5 TABLET ORAL
Qty: 45 TABLET | Refills: 1 | Status: SHIPPED | OUTPATIENT
Start: 2023-06-01

## 2023-06-01 RX ORDER — LEVOTHYROXINE SODIUM 0.12 MG/1
125 TABLET ORAL DAILY
Qty: 90 TABLET | Refills: 1 | Status: SHIPPED | OUTPATIENT
Start: 2023-06-01

## 2023-06-01 NOTE — PATIENT INSTRUCTIONS
Fasting blood work  Schedule colonoscopy  Weightbearing exercise for osteopenia  Follow-up 6 months

## 2023-06-01 NOTE — PROGRESS NOTES
"    Anastasiya Terrell is a 67 y.o. female.     History of Present Illness  67-year-old white female with history of hypothyroidism, insomnia, vitamin D deficiency, breast cancer, hyperlipidemia, chemo-induced peripheral neuropathy who comes in today for 6-month follow-up visit    Blood pressure 120/78 heart rate 86 she denies any chest pain, dyspnea, tachycardia or dizziness    Patient did get some radiculopathy in her right upper extremity from having lymph nodes removed during breast cancer episodes.  She did do some therapy which has helped with some of the swelling and movement    Patient had osteoporosis has been on Fosamax and calcium and last bone scan showed improvement osteopenia.  However cardiology told her her calcium levels were too high and took her off her calcium.  I encouraged her to do weightbearing exercises    Other than that patient is doing quite well.  Weight is the same at 146 with a BMI 25.1.  She has had 3 COVID vaccines up-to-date on eye exam, mammogram and DEXA scan.  She still need to schedule colonoscopy          Fasting blood work  Schedule colonoscopy  Weightbearing exercise for osteopenia  Follow-up 6 months       The following portions of the patient's history were reviewed and updated as appropriate: allergies, current medications, past family history, past medical history, past social history, past surgical history and problem list.    Vitals:    06/01/23 1349   BP: 120/78   BP Location: Left arm   Patient Position: Sitting   Cuff Size: Adult   Pulse: 86   Temp: 97.3 °F (36.3 °C)   TempSrc: Temporal   SpO2: 93%   Weight: 66.3 kg (146 lb 3.2 oz)   Height: 162.6 cm (64\")     Body mass index is 25.1 kg/m².    Past Medical History:   Diagnosis Date   • Acute bronchitis    • Breast cancer 2005    Right breast   • Chronic insomnia    • Drug therapy 2005    Right breast cancer   • Dyslipidemia    • Eczema    • Hypercalcemia    • Hyperlipidemia    • Hypothyroidism    • Metastatic " adenocarcinoma to breast    • Other abnormality of red blood cells    • Polycythemia     Mild     Past Surgical History:   Procedure Laterality Date   • BASAL CELL CARCINOMA EXCISION     • BREAST BIOPSY Right 2005   • BREAST LUMPECTOMY Right 2005   • VENOUS ACCESS DEVICE (PORT) INSERTION       Family History   Problem Relation Age of Onset   • Hyperlipidemia Mother    • Coronary artery disease Father    • Heart attack Father 62   • Hyperlipidemia Father      Immunization History   Administered Date(s) Administered   • COVID-19 (MODERNA) 1st,2nd,3rd Dose Monovalent 03/02/2021, 03/30/2021   • COVID-19 (MODERNA) Monovalent Original Booster 03/02/2021, 03/30/2021, 12/17/2021   • Hep A / Hep B 06/15/2018   • Hepatitis A 12/21/2018   • Tdap 03/10/2012       Results Encounter on 01/08/2023   Component Date Value Ref Range Status   • TSH 04/20/2023 3.760  0.450 - 4.500 uIU/mL Final   • Free T4 04/20/2023 1.60  0.82 - 1.77 ng/dL Final   • T3, Total 04/20/2023 88  71 - 180 ng/dL Final         Review of Systems    Objective   Physical Exam    Procedures    Assessment & Plan   Diagnoses and all orders for this visit:    1. Dyslipidemia (Primary)  -     Lipid Panel With LDL / HDL Ratio; Future  -     rosuvastatin (CRESTOR) 20 MG tablet; Take 1 tablet by mouth Daily.  Dispense: 90 tablet; Refill: 1    2. Acquired hypothyroidism  -     TSH+Free T4; Future  -     T3; Future    3. Preventative health care  -     CBC & Differential; Future  -     Comprehensive Metabolic Panel; Future  -     Hemoglobin A1c; Future    4. Insomnia, unspecified type  -     zolpidem (AMBIEN) 10 MG tablet; Take 0.5 tablets by mouth every night at bedtime.  Dispense: 45 tablet; Refill: 1    Other orders  -     alendronate (FOSAMAX) 70 MG tablet; Take 1 tablet by mouth Every 7 (Seven) Days.  Dispense: 4 tablet; Refill: 5  -     levothyroxine (SYNTHROID, LEVOTHROID) 125 MCG tablet; Take 1 tablet by mouth Daily.  Dispense: 90 tablet; Refill: 1          Current  Outpatient Medications:   •  alendronate (FOSAMAX) 70 MG tablet, Take 1 tablet by mouth Every 7 (Seven) Days., Disp: 4 tablet, Rfl: 5  •  cholecalciferol (VITAMIN D3) 25 MCG (1000 UT) tablet, Take 1 tablet by mouth 3 (Three) Times a Week., Disp: , Rfl:   •  levothyroxine (SYNTHROID, LEVOTHROID) 125 MCG tablet, Take 1 tablet by mouth Daily., Disp: 90 tablet, Rfl: 1  •  rosuvastatin (CRESTOR) 20 MG tablet, Take 1 tablet by mouth Daily., Disp: 90 tablet, Rfl: 1  •  zolpidem (AMBIEN) 10 MG tablet, Take 0.5 tablets by mouth every night at bedtime., Disp: 45 tablet, Rfl: 1           ANTONIO Nunez 6/1/2023 14:31 EDT  This note has been electronically signed

## 2023-06-01 NOTE — PROGRESS NOTES
Physical Therapy Daily Treatment Note    [address] [facility], [address]  [place] [address] [phone]    Patient: Anastasiya Terrell  : 1955  Referring practitioner: Daniel Vora MD  Diagnosis/ ICD10 code: Lymphedema of right arm [I89.0]  Today's Date: 2023    VISIT#: 8    Subjective   Pt reports: doing pretty well. R arm looks and feel better.       Objective     See Exercise, Manual, and Modality Logs for complete treatment.     Patient Education:    Assessment & Plan     Assessment    Assessment details: Pt has responded well to therapy. She is Independent with her HEP. Reduce swelling R UE.           Progress per Plan of Care            Timed:         Manual Therapy:  30      mins  00363;     Therapeutic Exercise:   16      mins  59481;     Neuromuscular Francia:       mins  98192;    Therapeutic Activity:          mins  83687;     Gait Training:           mins  61550;     Ultrasound:          mins  39862;    Ionto                                  mins   34712  Self Care                      8      mins   97351      Un-Timed:  Electrical Stimulation:        mins  77641 ( );  Traction          mins 97029  Canal repositioning           mins    83588        Timed Treatment:  54    mins   Total Treatment:    54   mins    Michael Green, PT, CLT  Physical Therapist  [place] License:  # [id number]

## 2023-06-06 ENCOUNTER — TREATMENT (OUTPATIENT)
Dept: PHYSICAL THERAPY | Facility: CLINIC | Age: 68
End: 2023-06-06
Payer: MEDICARE

## 2023-06-06 DIAGNOSIS — C50.911 MALIGNANT NEOPLASM OF RIGHT FEMALE BREAST, UNSPECIFIED ESTROGEN RECEPTOR STATUS, UNSPECIFIED SITE OF BREAST: ICD-10-CM

## 2023-06-06 DIAGNOSIS — I89.0 LYMPHEDEMA OF RIGHT ARM: Primary | ICD-10-CM

## 2023-06-06 PROCEDURE — 97140 MANUAL THERAPY 1/> REGIONS: CPT | Performed by: PHYSICAL THERAPIST

## 2023-06-06 PROCEDURE — 97110 THERAPEUTIC EXERCISES: CPT | Performed by: PHYSICAL THERAPIST

## 2023-06-06 PROCEDURE — 97535 SELF CARE MNGMENT TRAINING: CPT | Performed by: PHYSICAL THERAPIST

## 2023-06-06 NOTE — PROGRESS NOTES
Physical Therapy Daily Treatment Note/ DC summary     [facility], [address]  [place] [address]  [phone]    Patient: Anastasiya Terrell  : 1955  Referring practitioner: Daniel Vora MD  Diagnosis/ ICD10 code: Lymphedema of right arm [I89.0]  Today's Date: 2023    VISIT#: 9    Subjective   Pt reports: she is doing better, her arm feels better, doesn't feel as tight. Feels therapy has helped. Feels the exercises have helped. She made herself a pulley and really like using it.       Objective     See Exercise, Manual, and Modality Logs for complete treatment. Continued with there ex and manual therapy as noted. Issued GTB for progression of her HEP. Pt demonstrates understanding of her HEP and self massage and lymphedema precautions.       Circumferential measurements: In cm     Hand: R 17  Wrist: R 15  Forearm: R 24.5  Elbow: R 25  Upper arm: mid way btw elbow and axilla: R 26.5      Patient Education:    Assessment & Plan     Assessment    Assessment details: Pt has been seen for 9 visits for R UE lymphedema. Pt has responded well to therapy with decrease swelling and discomfort in R UE and improved functional use without increase symptoms. She has met all STGs and 3/5 LTGs.   DASH score: 2/2 %    Pt voices readiness for DC to continue with her HEP and self management of her condition.     Goals  Plan Goals: Plan Goals: STGs: in 4 weeks    1- Initiate manual lymph drainage massage and compression bandaging if indicated- MET  2- Pt to be instructed in HEP to reduce swelling / improve ROM and improve lymphatic flow - MET  3- Reduce swelling by 1 cm - MET  4- Pt will report 25 % improvement in functional mobility - MET      LTGs: by DC     1- Pt will be independent with self management of her condition - MET  2- Reduce swelling by  1.5 cm - Not MET  3- Pt will be fitted with proper compression garment and will be independent with donning/ doffing ( if compression garment is indicated )- NA  4-  Pt will report 75 % improvement and pain reduction- MET  5- Pt will voice readiness for DC with independent program  - MET             Plan  Plan details: Pt will be DC.                     Timed:         Manual Therapy:   30      mins  97284;     Therapeutic Exercise:   16      mins  75759;     Neuromuscular Francia:        mins  84461;    Therapeutic Activity:          mins  39848;     Gait Training:           mins  61769;     Ultrasound:          mins  89161;    Ionto                                   mins   61649  Self Care                       8     mins   87626      Un-Timed:  Electrical Stimulation:         mins  37853 ( );  Traction          mins 37938  Canal repositioning           mins    92954        Timed Treatment:   54   mins   Total Treatment:    54    mins    Michael Green, PT, CLT  Physical Therapist  [place] License:  # [id number]

## 2023-09-20 RX ORDER — ALENDRONATE SODIUM 70 MG/1
TABLET ORAL
Qty: 4 TABLET | Refills: 2 | Status: SHIPPED | OUTPATIENT
Start: 2023-09-20

## 2023-11-16 ENCOUNTER — TELEPHONE (OUTPATIENT)
Dept: FAMILY MEDICINE CLINIC | Facility: CLINIC | Age: 68
End: 2023-11-16
Payer: MEDICARE

## 2023-11-17 PROBLEM — Z86.39 HISTORY OF ELEVATED GLUCOSE: Status: ACTIVE | Noted: 2023-11-17

## 2023-11-20 NOTE — TELEPHONE ENCOUNTER
I do not see that the labs from 11/17 has been resulted to patient? You usually don't do labs prior to labs, Does this hold for her as well? Making sure before I call.

## 2023-11-21 DIAGNOSIS — E78.5 DYSLIPIDEMIA: ICD-10-CM

## 2023-11-21 RX ORDER — ROSUVASTATIN CALCIUM 20 MG/1
20 TABLET, COATED ORAL DAILY
Qty: 90 TABLET | Refills: 1 | Status: SHIPPED | OUTPATIENT
Start: 2023-11-21

## 2023-11-30 ENCOUNTER — OFFICE VISIT (OUTPATIENT)
Dept: FAMILY MEDICINE CLINIC | Facility: CLINIC | Age: 68
End: 2023-11-30
Payer: MEDICARE

## 2023-11-30 VITALS
DIASTOLIC BLOOD PRESSURE: 60 MMHG | BODY MASS INDEX: 24.59 KG/M2 | WEIGHT: 144 LBS | SYSTOLIC BLOOD PRESSURE: 108 MMHG | TEMPERATURE: 97.6 F | OXYGEN SATURATION: 96 % | HEIGHT: 64 IN | HEART RATE: 71 BPM

## 2023-11-30 DIAGNOSIS — Z79.899 ENCOUNTER FOR LONG-TERM (CURRENT) USE OF OTHER MEDICATIONS: Primary | ICD-10-CM

## 2023-11-30 DIAGNOSIS — G47.00 INSOMNIA, UNSPECIFIED TYPE: ICD-10-CM

## 2023-11-30 PROCEDURE — G0439 PPPS, SUBSEQ VISIT: HCPCS | Performed by: NURSE PRACTITIONER

## 2023-11-30 RX ORDER — LEVOTHYROXINE SODIUM 0.12 MG/1
125 TABLET ORAL DAILY
Qty: 90 TABLET | Refills: 1 | Status: SHIPPED | OUTPATIENT
Start: 2023-11-30

## 2023-11-30 RX ORDER — ZOLPIDEM TARTRATE 10 MG/1
5 TABLET ORAL
Qty: 45 TABLET | Refills: 1 | Status: SHIPPED | OUTPATIENT
Start: 2023-11-30

## 2023-11-30 RX ORDER — ALENDRONATE SODIUM 70 MG/1
70 TABLET ORAL
Qty: 4 TABLET | Refills: 2 | Status: SHIPPED | OUTPATIENT
Start: 2023-11-30

## 2023-11-30 NOTE — PROGRESS NOTES
"    Anastasiya Terrell is a 67 y.o. female.     History of Present Illness  67-year-old white female with history of hypothyroidism, insomnia, vitamin D deficiency, breast cancer, hyperlipidemia, chemo induced peripheral neuropathy who comes in today for Medicare wellness visit    Blood pressure 108/60 heart rate 70 she denies any chest pain, dyspnea, tachycardia or dizziness    Patient states that her Ambien is working well for her to help her sleep we will refill that.  New narcotic contract signed and urine drug screen done today    Weight is down 2 pounds at 144 with a BMI 24.7.  She has had 3 COVID vaccines still needs to schedule eye exam.  Her mammogram is due in March 2044, DEXA scan due in  May 2024.  She had a hysterectomy and longer does Pap smears her next colonoscopy is due in November 2026.  Recent labs in the chart were within normal limits and stable          Urine drug screen/narcotic contract  Schedule eye exam  Follow-up 6 months                       The following portions of the patient's history were reviewed and updated as appropriate: allergies, current medications, past family history, past medical history, past social history, past surgical history, and problem list.    Vitals:    11/30/23 1411   BP: 108/60   BP Location: Right arm   Patient Position: Sitting   Cuff Size: Adult   Pulse: 71   Temp: 97.6 °F (36.4 °C)   TempSrc: Infrared   SpO2: 96%   Weight: 65.3 kg (144 lb)   Height: 162.6 cm (64.02\")     Body mass index is 24.71 kg/m².    Past Medical History:   Diagnosis Date    Acute bronchitis     Breast cancer 2005    Right breast    Chronic insomnia     Drug therapy 2005    Right breast cancer    Dyslipidemia     Eczema     Hypercalcemia     Hyperlipidemia     Hypothyroidism     Metastatic adenocarcinoma to breast     Other abnormality of red blood cells     Polycythemia     Mild     Past Surgical History:   Procedure Laterality Date    BASAL CELL CARCINOMA EXCISION      BREAST BIOPSY " Right 2005    BREAST LUMPECTOMY Right 2005    VENOUS ACCESS DEVICE (PORT) INSERTION       Family History   Problem Relation Age of Onset    Hyperlipidemia Mother     Coronary artery disease Father     Heart attack Father 62    Hyperlipidemia Father      Immunization History   Administered Date(s) Administered    COVID-19 (MODERNA) 1st,2nd,3rd Dose Monovalent 03/02/2021, 03/30/2021    COVID-19 (MODERNA) Monovalent Original Booster 03/02/2021, 03/30/2021, 12/17/2021    Hep A / Hep B 06/15/2018    Hepatitis A 12/21/2018    Tdap 03/10/2012       Results Encounter on 06/06/2023   Component Date Value Ref Range Status    WBC 11/17/2023 4.9  3.4 - 10.8 x10E3/uL Final    RBC 11/17/2023 4.80  3.77 - 5.28 x10E6/uL Final    Hemoglobin 11/17/2023 14.2  11.1 - 15.9 g/dL Final    Hematocrit 11/17/2023 42.6  34.0 - 46.6 % Final    MCV 11/17/2023 89  79 - 97 fL Final    MCH 11/17/2023 29.6  26.6 - 33.0 pg Final    MCHC 11/17/2023 33.3  31.5 - 35.7 g/dL Final    RDW 11/17/2023 13.0  11.7 - 15.4 % Final    Platelets 11/17/2023 244  150 - 450 x10E3/uL Final    Neutrophil Rel % 11/17/2023 48  Not Estab. % Final    Lymphocyte Rel % 11/17/2023 37  Not Estab. % Final    Monocyte Rel % 11/17/2023 10  Not Estab. % Final    Eosinophil Rel % 11/17/2023 4  Not Estab. % Final    Basophil Rel % 11/17/2023 1  Not Estab. % Final    Neutrophils Absolute 11/17/2023 2.4  1.4 - 7.0 x10E3/uL Final    Lymphocytes Absolute 11/17/2023 1.8  0.7 - 3.1 x10E3/uL Final    Monocytes Absolute 11/17/2023 0.5  0.1 - 0.9 x10E3/uL Final    Eosinophils Absolute 11/17/2023 0.2  0.0 - 0.4 x10E3/uL Final    Basophils Absolute 11/17/2023 0.1  0.0 - 0.2 x10E3/uL Final    Immature Granulocyte Rel % 11/17/2023 0  Not Estab. % Final    Immature Grans Absolute 11/17/2023 0.0  0.0 - 0.1 x10E3/uL Final    Glucose 11/17/2023 83  70 - 99 mg/dL Final    BUN 11/17/2023 12  8 - 27 mg/dL Final    Creatinine 11/17/2023 1.03 (H)  0.57 - 1.00 mg/dL Final    EGFR Result 11/17/2023 59 (L)   >59 mL/min/1.73 Final    BUN/Creatinine Ratio 11/17/2023 12  12 - 28 Final    Sodium 11/17/2023 143  134 - 144 mmol/L Final    Potassium 11/17/2023 4.3  3.5 - 5.2 mmol/L Final    Chloride 11/17/2023 103  96 - 106 mmol/L Final    Total CO2 11/17/2023 25  20 - 29 mmol/L Final    Calcium 11/17/2023 9.4  8.7 - 10.3 mg/dL Final    Total Protein 11/17/2023 6.5  6.0 - 8.5 g/dL Final    Albumin 11/17/2023 4.4  3.9 - 4.9 g/dL Final    Globulin 11/17/2023 2.1  1.5 - 4.5 g/dL Final    A/G Ratio 11/17/2023 2.1  1.2 - 2.2 Final    Total Bilirubin 11/17/2023 1.1  0.0 - 1.2 mg/dL Final    Alkaline Phosphatase 11/17/2023 75  44 - 121 IU/L Final    AST (SGOT) 11/17/2023 19  0 - 40 IU/L Final    ALT (SGPT) 11/17/2023 17  0 - 32 IU/L Final    Total Cholesterol 11/17/2023 210 (H)  100 - 199 mg/dL Final    Triglycerides 11/17/2023 206 (H)  0 - 149 mg/dL Final    HDL Cholesterol 11/17/2023 53  >39 mg/dL Final    VLDL Cholesterol Leroy 11/17/2023 36  5 - 40 mg/dL Final    LDL Chol Calc (NIH) 11/17/2023 121 (H)  0 - 99 mg/dL Final    LDL/HDL RATIO 11/17/2023 2.3  0.0 - 3.2 ratio Final    Comment:                                     LDL/HDL Ratio                                              Men  Women                                1/2 Avg.Risk  1.0    1.5                                    Avg.Risk  3.6    3.2                                 2X Avg.Risk  6.2    5.0                                 3X Avg.Risk  8.0    6.1      TSH 11/17/2023 3.160  0.450 - 4.500 uIU/mL Final    Free T4 11/17/2023 1.73  0.82 - 1.77 ng/dL Final    T3, Total 11/17/2023 90  71 - 180 ng/dL Final    Specimen Status 11/17/2023 Comment   Final    Comment: ABN Option 3  ABN Option 3  One or more tests were removed at the request of the patient and may  not be represented on this report. As a result, some or all of the  tests originally requested may not have been performed or may be  reported separately. Please contact your patient regarding any  necessary  follow-up.           Review of Systems   Constitutional: Negative.    HENT: Negative.     Respiratory: Negative.     Cardiovascular: Negative.    Gastrointestinal: Negative.    Genitourinary: Negative.    Musculoskeletal: Negative.    Skin: Negative.    Neurological: Negative.    Psychiatric/Behavioral: Negative.         Objective   Physical Exam  Constitutional:       Appearance: Normal appearance.   HENT:      Head: Normocephalic.   Cardiovascular:      Rate and Rhythm: Normal rate and regular rhythm.      Pulses: Normal pulses.      Heart sounds: Normal heart sounds.   Pulmonary:      Effort: Pulmonary effort is normal.      Breath sounds: Normal breath sounds.   Abdominal:      General: Bowel sounds are normal.   Musculoskeletal:         General: Normal range of motion.   Skin:     General: Skin is warm.   Neurological:      General: No focal deficit present.      Mental Status: She is alert and oriented to person, place, and time.   Psychiatric:         Mood and Affect: Mood normal.         Behavior: Behavior normal.         Procedures    Assessment & Plan   Diagnoses and all orders for this visit:    1. Encounter for long-term (current) use of other medications (Primary)  -     Urine Drug Screen - Urine, Clean Catch    2. Insomnia, unspecified type  -     zolpidem (AMBIEN) 10 MG tablet; Take 0.5 tablets by mouth every night at bedtime.  Dispense: 45 tablet; Refill: 1    3. BMI 24.0-24.9, adult    Other orders  -     alendronate (FOSAMAX) 70 MG tablet; Take 1 tablet by mouth Every 7 (Seven) Days.  Dispense: 4 tablet; Refill: 2  -     levothyroxine (SYNTHROID, LEVOTHROID) 125 MCG tablet; Take 1 tablet by mouth Daily.  Dispense: 90 tablet; Refill: 1           Current Outpatient Medications:     alendronate (FOSAMAX) 70 MG tablet, Take 1 tablet by mouth Every 7 (Seven) Days., Disp: 4 tablet, Rfl: 2    cholecalciferol (VITAMIN D3) 25 MCG (1000 UT) tablet, Take 1 tablet by mouth 3 (Three) Times a Week., Disp: , Rfl:      levothyroxine (SYNTHROID, LEVOTHROID) 125 MCG tablet, Take 1 tablet by mouth Daily., Disp: 90 tablet, Rfl: 1    rosuvastatin (CRESTOR) 20 MG tablet, Take 1 tablet by mouth Daily., Disp: 90 tablet, Rfl: 1    zolpidem (AMBIEN) 10 MG tablet, Take 0.5 tablets by mouth every night at bedtime., Disp: 45 tablet, Rfl: 1           Norma Rodriguez, APRN 11/30/2023 16:38 EST  This note has been electronically signed

## 2023-12-01 LAB
AMPHETAMINES UR QL SCN: NEGATIVE NG/ML
BARBITURATES UR QL SCN: NEGATIVE NG/ML
BENZODIAZ UR QL SCN: NEGATIVE NG/ML
BZE UR QL SCN: NEGATIVE NG/ML
CANNABINOIDS UR QL SCN: NEGATIVE NG/ML
CREAT UR-MCNC: 69.8 MG/DL (ref 20–300)
LABORATORY COMMENT REPORT: NORMAL
METHADONE UR QL SCN: NEGATIVE NG/ML
OPIATES UR QL SCN: NEGATIVE NG/ML
OXYCODONE+OXYMORPHONE UR QL SCN: NEGATIVE NG/ML
PCP UR QL: NEGATIVE NG/ML
PH UR: 5.2 [PH] (ref 4.5–8.9)
PROPOXYPH UR QL SCN: NEGATIVE NG/ML

## 2024-01-08 RX ORDER — ALENDRONATE SODIUM 70 MG/1
70 TABLET ORAL
Qty: 4 TABLET | Refills: 2 | Status: SHIPPED | OUTPATIENT
Start: 2024-01-08

## 2024-02-16 DIAGNOSIS — E78.5 DYSLIPIDEMIA: ICD-10-CM

## 2024-02-17 RX ORDER — ROSUVASTATIN CALCIUM 20 MG/1
20 TABLET, COATED ORAL DAILY
Qty: 90 TABLET | Refills: 0 | Status: SHIPPED | OUTPATIENT
Start: 2024-02-17

## 2024-02-17 RX ORDER — LEVOTHYROXINE SODIUM 0.12 MG/1
125 TABLET ORAL DAILY
Qty: 90 TABLET | Refills: 0 | Status: SHIPPED | OUTPATIENT
Start: 2024-02-17

## 2024-04-03 ENCOUNTER — TRANSCRIBE ORDERS (OUTPATIENT)
Dept: ADMINISTRATIVE | Facility: HOSPITAL | Age: 69
End: 2024-04-03
Payer: MEDICARE

## 2024-04-03 DIAGNOSIS — Z12.31 VISIT FOR SCREENING MAMMOGRAM: Primary | ICD-10-CM

## 2024-04-08 ENCOUNTER — TRANSCRIBE ORDERS (OUTPATIENT)
Dept: ADMINISTRATIVE | Facility: HOSPITAL | Age: 69
End: 2024-04-08
Payer: MEDICARE

## 2024-04-08 DIAGNOSIS — M81.8 OSTEOPOROSIS AND OCULOCUTANEOUS HYPOPIGMENTATION SYNDROME: Primary | ICD-10-CM

## 2024-04-08 DIAGNOSIS — L81.8 OSTEOPOROSIS AND OCULOCUTANEOUS HYPOPIGMENTATION SYNDROME: Primary | ICD-10-CM

## 2024-04-08 DIAGNOSIS — Q87.89 OSTEOPOROSIS AND OCULOCUTANEOUS HYPOPIGMENTATION SYNDROME: Primary | ICD-10-CM

## 2024-04-12 ENCOUNTER — HOSPITAL ENCOUNTER (OUTPATIENT)
Dept: MAMMOGRAPHY | Facility: HOSPITAL | Age: 69
Discharge: HOME | End: 2024-04-12
Admitting: NURSE PRACTITIONER
Payer: MEDICARE

## 2024-04-12 DIAGNOSIS — Z12.31 VISIT FOR SCREENING MAMMOGRAM: ICD-10-CM

## 2024-04-12 PROCEDURE — 77067 SCR MAMMO BI INCL CAD: CPT

## 2024-04-12 PROCEDURE — 77063 BREAST TOMOSYNTHESIS BI: CPT

## 2024-04-17 ENCOUNTER — TELEPHONE (OUTPATIENT)
Dept: FAMILY MEDICINE CLINIC | Facility: CLINIC | Age: 69
End: 2024-04-17
Payer: MEDICARE

## 2024-08-13 ENCOUNTER — OFFICE VISIT (OUTPATIENT)
Dept: FAMILY MEDICINE CLINIC | Facility: CLINIC | Age: 69
End: 2024-08-13
Payer: MEDICARE

## 2024-08-13 VITALS
BODY MASS INDEX: 24.75 KG/M2 | HEART RATE: 76 BPM | TEMPERATURE: 97.1 F | SYSTOLIC BLOOD PRESSURE: 108 MMHG | OXYGEN SATURATION: 96 % | WEIGHT: 145 LBS | HEIGHT: 64 IN | DIASTOLIC BLOOD PRESSURE: 64 MMHG

## 2024-08-13 DIAGNOSIS — G47.00 INSOMNIA, UNSPECIFIED TYPE: ICD-10-CM

## 2024-08-13 DIAGNOSIS — E03.9 ACQUIRED HYPOTHYROIDISM: ICD-10-CM

## 2024-08-13 DIAGNOSIS — Z79.891 NARCOTIC USE AGREEMENT EXISTS: ICD-10-CM

## 2024-08-13 DIAGNOSIS — R79.89 ELEVATED SERUM CREATININE: ICD-10-CM

## 2024-08-13 DIAGNOSIS — Z00.00 PREVENTATIVE HEALTH CARE: ICD-10-CM

## 2024-08-13 DIAGNOSIS — E78.5 DYSLIPIDEMIA: Primary | ICD-10-CM

## 2024-08-13 PROCEDURE — 1126F AMNT PAIN NOTED NONE PRSNT: CPT | Performed by: NURSE PRACTITIONER

## 2024-08-13 PROCEDURE — 99214 OFFICE O/P EST MOD 30 MIN: CPT | Performed by: NURSE PRACTITIONER

## 2024-08-13 RX ORDER — LEVOTHYROXINE SODIUM 0.12 MG/1
125 TABLET ORAL DAILY
Qty: 90 TABLET | Refills: 1 | Status: SHIPPED | OUTPATIENT
Start: 2024-08-13

## 2024-08-13 RX ORDER — ALENDRONATE SODIUM 70 MG/1
70 TABLET ORAL
Qty: 4 TABLET | Refills: 2 | Status: SHIPPED | OUTPATIENT
Start: 2024-08-13

## 2024-08-13 RX ORDER — ROSUVASTATIN CALCIUM 20 MG/1
20 TABLET, COATED ORAL DAILY
Qty: 90 TABLET | Refills: 1 | Status: SHIPPED | OUTPATIENT
Start: 2024-08-13

## 2024-08-13 RX ORDER — ZOLPIDEM TARTRATE 5 MG/1
5 TABLET ORAL NIGHTLY PRN
Qty: 90 TABLET | Refills: 1 | Status: SHIPPED | OUTPATIENT
Start: 2024-08-13

## 2024-08-13 NOTE — PROGRESS NOTES
"    Anastasiya Terrell is a 68 y.o. female.     History of Present Illness  68-year-old white female with history of hypothyroidism, insomnia, vitamin D deficiency, breast cancer, hyperlipidemia, chemo-induced peripheral neuropathy who comes in today for 6-month follow-up visit fasting blood work    Blood pressure 108/64 heart rate 76 she denies any chest pain, dyspnea, tachycardia or dizziness    Patient really does not have any new complaints is doing well.  She is sleeping good with the Ambien at 5 mg at night.  Will do a tox screen today    Labs were pretty stable last time very mildly elevated creatinine and lipid panel will be doing fasting labs today    Weight is 145 with a BMI of 24.9.  She has had 3 COVID vaccines up-to-date on eye exam.  Her mammogram is due in April 2025 DEXA scan is due in May 2025.  She is up-to-date on Pap smears and her next colonoscopy is due November 2026            Fasting blood work  Urine tox screen  Follow-up 6 months       The following portions of the patient's history were reviewed and updated as appropriate: allergies, current medications, past family history, past medical history, past social history, past surgical history, and problem list.    Vitals:    08/13/24 1036   BP: 108/64   BP Location: Right arm   Patient Position: Sitting   Cuff Size: Adult   Pulse: 76   Temp: 97.1 °F (36.2 °C)   TempSrc: Infrared   SpO2: 96%   Weight: 65.8 kg (145 lb)   Height: 162.6 cm (64.02\")     Body mass index is 24.88 kg/m².  BMI is within normal parameters. No other follow-up for BMI required.       Past Medical History:   Diagnosis Date    Acute bronchitis     Breast cancer 2005    Right breast    Chronic insomnia     Drug therapy 2005    Right breast cancer    Dyslipidemia     Eczema     Hypercalcemia     Hyperlipidemia     Hypothyroidism     Metastatic adenocarcinoma to breast     Other abnormality of red blood cells     Polycythemia     Mild     Past Surgical History:   Procedure " Laterality Date    BASAL CELL CARCINOMA EXCISION      BREAST BIOPSY Right 2005    BREAST LUMPECTOMY Right 2005    VENOUS ACCESS DEVICE (PORT) INSERTION       Family History   Problem Relation Age of Onset    Hyperlipidemia Mother     Coronary artery disease Father     Heart attack Father 62    Hyperlipidemia Father      Immunization History   Administered Date(s) Administered    COVID-19 (MODERNA) 1st,2nd,3rd Dose Monovalent 03/02/2021, 03/30/2021    COVID-19 (MODERNA) Monovalent Original Booster 03/02/2021, 03/30/2021, 12/17/2021    Hep A / Hep B 06/15/2018    Hepatitis A 12/21/2018    Tdap 03/10/2012       Office Visit on 11/30/2023   Component Date Value Ref Range Status    Amphetamine, Urine Qual 11/30/2023 Negative  Wglwwy=1165 ng/mL Final    Barbiturates Screen, Urine 11/30/2023 Negative  Vsriok=490 ng/mL Final    Benzodiazepine Screen, Urine 11/30/2023 Negative  Ncsaqz=621 ng/mL Final    THC Screen, Urine 11/30/2023 Negative  Cutoff=20 ng/mL Final    Cocaine Screen, Urine 11/30/2023 Negative  Eijndk=902 ng/mL Final    Opiate Screen, Urine 11/30/2023 Negative  Rlduqv=169 ng/mL Final    Opiate test includes Codeine, Morphine, Hydromorphone, Hydrocodone.    Oxycodone/Oxymorphone, Urine 11/30/2023 Negative  Sbxhds=913 ng/mL Final    Test includes Oxycodone and Oxymorphone    Phencyclidine (PCP), Urine 11/30/2023 Negative  Cutoff=25 ng/mL Final    Methadone Screen, Urine 11/30/2023 Negative  Fakgvy=014 ng/mL Final    Propoxyphene Screen 11/30/2023 Negative  Ufdvur=993 ng/mL Final    Creatinine, Urine 11/30/2023 69.8  20.0 - 300.0 mg/dL Final    pH, UA 11/30/2023 5.2  4.5 - 8.9 Final    Please note 11/30/2023 Comment   Final    Comment: This assay provides a preliminary unconfirmed analytical test  result that may be suitable for clinical management of patients  in certain situations. Drug-test results should be interpreted  in the context of clinical information. Patient metabolic  variables, specific drug  chemistry, and specimen  characteristics can affect test outcome. Technical consultation  is available if a test result is inconsistent with an expected  outcome. ([email or web address] or call toll-free  [phone])           Review of Systems   Constitutional: Negative.    HENT: Negative.     Respiratory: Negative.     Cardiovascular: Negative.    Gastrointestinal: Negative.    Genitourinary: Negative.    Musculoskeletal: Negative.    Skin: Negative.    Neurological: Negative.    Psychiatric/Behavioral: Negative.         Objective   Physical Exam  Constitutional:       Appearance: Normal appearance.   HENT:      Head: Normocephalic.   Cardiovascular:      Rate and Rhythm: Normal rate and regular rhythm.      Pulses: Normal pulses.      Heart sounds: Normal heart sounds.   Pulmonary:      Effort: Pulmonary effort is normal.      Breath sounds: Normal breath sounds.   Abdominal:      General: Bowel sounds are normal.   Musculoskeletal:         General: Normal range of motion.   Skin:     General: Skin is warm.   Neurological:      General: No focal deficit present.      Mental Status: She is alert and oriented to person, place, and time.   Psychiatric:         Mood and Affect: Mood normal.         Behavior: Behavior normal.         Procedures    Assessment & Plan   Diagnoses and all orders for this visit:    1. Dyslipidemia (Primary)  -     Lipid Panel With LDL / HDL Ratio  -     rosuvastatin (CRESTOR) 20 MG tablet; Take 1 tablet by mouth Daily.  Dispense: 90 tablet; Refill: 1    2. Acquired hypothyroidism  -     TSH+Free T4  -     T3    3. Preventative health care  -     CBC & Differential    4. Elevated serum creatinine  -     Comprehensive Metabolic Panel  -     Hemoglobin A1c    5. Narcotic use agreement exists  -     ToxAssure Flex 24, Ur -    6. Insomnia, unspecified type    7. BMI 24.0-24.9, adult    Other orders  -     alendronate (FOSAMAX) 70 MG tablet; Take 1 tablet by mouth Every 7 (Seven)  Days.  Dispense: 4 tablet; Refill: 2  -     levothyroxine (SYNTHROID, LEVOTHROID) 125 MCG tablet; Take 1 tablet by mouth Daily.  Dispense: 90 tablet; Refill: 1  -     zolpidem (Ambien) 5 MG tablet; Take 1 tablet by mouth At Night As Needed for Sleep.  Dispense: 90 tablet; Refill: 1           Current Outpatient Medications:     alendronate (FOSAMAX) 70 MG tablet, Take 1 tablet by mouth Every 7 (Seven) Days., Disp: 4 tablet, Rfl: 2    cholecalciferol (VITAMIN D3) 25 MCG (1000 UT) tablet, Take 1 tablet by mouth 3 (Three) Times a Week., Disp: , Rfl:     levothyroxine (SYNTHROID, LEVOTHROID) 125 MCG tablet, Take 1 tablet by mouth Daily., Disp: 90 tablet, Rfl: 1    rosuvastatin (CRESTOR) 20 MG tablet, Take 1 tablet by mouth Daily., Disp: 90 tablet, Rfl: 1    zolpidem (Ambien) 5 MG tablet, Take 1 tablet by mouth At Night As Needed for Sleep., Disp: 90 tablet, Rfl: 1           ANTONIO Nunez 8/13/2024 11:03 EDT  This note has been electronically signed

## 2024-08-14 LAB
ALBUMIN SERPL-MCNC: 4.7 G/DL (ref 3.9–4.9)
ALP SERPL-CCNC: 86 IU/L (ref 44–121)
ALT SERPL-CCNC: 21 IU/L (ref 0–32)
AST SERPL-CCNC: 25 IU/L (ref 0–40)
BASOPHILS # BLD AUTO: 0.1 X10E3/UL (ref 0–0.2)
BASOPHILS NFR BLD AUTO: 1 %
BILIRUB SERPL-MCNC: 1.3 MG/DL (ref 0–1.2)
BUN SERPL-MCNC: 10 MG/DL (ref 8–27)
BUN/CREAT SERPL: 10 (ref 12–28)
CALCIUM SERPL-MCNC: 10.2 MG/DL (ref 8.7–10.3)
CHLORIDE SERPL-SCNC: 102 MMOL/L (ref 96–106)
CHOLEST SERPL-MCNC: 236 MG/DL (ref 100–199)
CO2 SERPL-SCNC: 28 MMOL/L (ref 20–29)
CREAT SERPL-MCNC: 1 MG/DL (ref 0.57–1)
EGFRCR SERPLBLD CKD-EPI 2021: 61 ML/MIN/1.73
EOSINOPHIL # BLD AUTO: 0.2 X10E3/UL (ref 0–0.4)
EOSINOPHIL NFR BLD AUTO: 3 %
ERYTHROCYTE [DISTWIDTH] IN BLOOD BY AUTOMATED COUNT: 13.1 % (ref 11.7–15.4)
GLOBULIN SER CALC-MCNC: 2.3 G/DL (ref 1.5–4.5)
GLUCOSE SERPL-MCNC: 86 MG/DL (ref 70–99)
HBA1C MFR BLD: 5.4 % (ref 4.8–5.6)
HCT VFR BLD AUTO: 50.8 % (ref 34–46.6)
HDLC SERPL-MCNC: 59 MG/DL
HGB BLD-MCNC: 16 G/DL (ref 11.1–15.9)
IMM GRANULOCYTES # BLD AUTO: 0 X10E3/UL (ref 0–0.1)
IMM GRANULOCYTES NFR BLD AUTO: 0 %
LDLC SERPL CALC-MCNC: 135 MG/DL (ref 0–99)
LDLC/HDLC SERPL: 2.3 RATIO (ref 0–3.2)
LYMPHOCYTES # BLD AUTO: 1.9 X10E3/UL (ref 0.7–3.1)
LYMPHOCYTES NFR BLD AUTO: 31 %
MCH RBC QN AUTO: 29.5 PG (ref 26.6–33)
MCHC RBC AUTO-ENTMCNC: 31.5 G/DL (ref 31.5–35.7)
MCV RBC AUTO: 94 FL (ref 79–97)
MONOCYTES # BLD AUTO: 0.5 X10E3/UL (ref 0.1–0.9)
MONOCYTES NFR BLD AUTO: 8 %
NEUTROPHILS # BLD AUTO: 3.6 X10E3/UL (ref 1.4–7)
NEUTROPHILS NFR BLD AUTO: 57 %
PLATELET # BLD AUTO: 267 X10E3/UL (ref 150–450)
POTASSIUM SERPL-SCNC: 5.1 MMOL/L (ref 3.5–5.2)
PROT SERPL-MCNC: 7 G/DL (ref 6–8.5)
RBC # BLD AUTO: 5.43 X10E6/UL (ref 3.77–5.28)
SODIUM SERPL-SCNC: 142 MMOL/L (ref 134–144)
T3 SERPL-MCNC: 91 NG/DL (ref 71–180)
T4 FREE SERPL-MCNC: 1.78 NG/DL (ref 0.82–1.77)
TRIGL SERPL-MCNC: 234 MG/DL (ref 0–149)
TSH SERPL DL<=0.005 MIU/L-ACNC: 1.51 UIU/ML (ref 0.45–4.5)
VLDLC SERPL CALC-MCNC: 42 MG/DL (ref 5–40)
WBC # BLD AUTO: 6.2 X10E3/UL (ref 3.4–10.8)

## 2024-08-18 LAB
1OH-MIDAZOLAM UR QL SCN: NOT DETECTED NG/MG CREAT
6MAM UR QL SCN: NEGATIVE NG/ML
7AMINOCLONAZEPAM/CREAT UR: NOT DETECTED NG/MG CREAT
A-OH ALPRAZ/CREAT UR: NOT DETECTED NG/MG CREAT
A-OH-TRIAZOLAM/CREAT UR CFM: NOT DETECTED NG/MG CREAT
ACP UR QL CFM: NOT DETECTED
ALPRAZ/CREAT UR CFM: NOT DETECTED NG/MG CREAT
AMPHETAMINES UR QL SCN: NEGATIVE NG/ML
APAP UR QL SCN: NEGATIVE UG/ML
BARBITURATES UR QL SCN: NEGATIVE NG/ML
BENZODIAZ SCN METH UR: NEGATIVE
BUPRENORPHINE UR QL SCN: NEGATIVE
BUPRENORPHINE/CREAT UR: NOT DETECTED NG/MG CREAT
CANNABINOIDS UR QL SCN: NEGATIVE NG/ML
CARISOPRODOL UR QL: NEGATIVE NG/ML
CLONAZEPAM/CREAT UR CFM: NOT DETECTED NG/MG CREAT
COCAINE+BZE UR QL SCN: NEGATIVE NG/ML
CREAT UR-MCNC: 108 MG/DL
D-METHORPHAN UR-MCNC: NOT DETECTED NG/ML
D-METHORPHAN+LEVORPHANOL UR QL: NOT DETECTED
DESALKYLFLURAZ/CREAT UR: NOT DETECTED NG/MG CREAT
DIAZEPAM/CREAT UR: NOT DETECTED NG/MG CREAT
ETHANOL UR QL SCN: NEGATIVE G/DL
ETHANOL UR QL SCN: NEGATIVE NG/ML
FENTANYL CTO UR SCN-MCNC: NEGATIVE NG/ML
FENTANYL/CREAT UR: NOT DETECTED NG/MG CREAT
FLUNITRAZEPAM UR QL SCN: NOT DETECTED NG/MG CREAT
GABAPENTIN UR-MCNC: NEGATIVE UG/ML
HALLUCINOGENS UR: NEGATIVE
HYPNOTICS UR QL SCN: NORMAL
KETAMINE UR QL: NOT DETECTED
LORAZEPAM/CREAT UR: NOT DETECTED NG/MG CREAT
ME-PHENIDATE UR-MCNC: NOT DETECTED UG/ML
MEPERIDINE UR QL SCN: NEGATIVE NG/ML
METHADONE UR QL SCN: NEGATIVE NG/ML
METHADONE+METAB UR QL SCN: NEGATIVE NG/ML
MIDAZOLAM/CREAT UR CFM: NOT DETECTED NG/MG CREAT
MISCELLANEOUS, UR: NEGATIVE
NORBUPRENORPHINE/CREAT UR: NOT DETECTED NG/MG CREAT
NORDIAZEPAM/CREAT UR: NOT DETECTED NG/MG CREAT
NORFENTANYL/CREAT UR: NOT DETECTED NG/MG CREAT
NORFLUNITRAZEPAM UR-MCNC: NOT DETECTED NG/MG CREAT
NORKETAMINE UR-MCNC: NOT DETECTED UG/ML
OPIATES UR SCN-MCNC: NEGATIVE NG/ML
OXAZEPAM/CREAT UR: NOT DETECTED NG/MG CREAT
OXYCODONE CTO UR SCN-MCNC: NEGATIVE NG/ML
PCP UR QL SCN: NEGATIVE NG/ML
PPAA UR QL SCN: NOT DETECTED
PRESCRIBED MEDICATIONS: NORMAL
PROPOXYPH UR QL SCN: NEGATIVE NG/ML
SYMPATHOMIMETICS UR QL: NEGATIVE
TAPENTADOL CTO UR SCN-MCNC: NEGATIVE NG/ML
TEMAZEPAM/CREAT UR: NOT DETECTED NG/MG CREAT
TRAMADOL UR QL SCN: NEGATIVE NG/ML
ZALEPLON UR-MCNC: NOT DETECTED NG/ML
ZOLPIDEM PHENYL-4-CARB UR QL SCN: PRESENT
ZOLPIDEM UR QL SCN: PRESENT
ZOPICLONE-N-OXIDE UR-MCNC: NOT DETECTED NG/ML

## 2024-11-18 DIAGNOSIS — E78.5 DYSLIPIDEMIA: ICD-10-CM

## 2024-11-18 RX ORDER — LEVOTHYROXINE SODIUM 125 UG/1
125 TABLET ORAL DAILY
Qty: 90 TABLET | Refills: 1 | Status: SHIPPED | OUTPATIENT
Start: 2024-11-18

## 2024-11-18 RX ORDER — ROSUVASTATIN CALCIUM 20 MG/1
20 TABLET, COATED ORAL DAILY
Qty: 90 TABLET | Refills: 1 | Status: SHIPPED | OUTPATIENT
Start: 2024-11-18

## 2025-02-17 ENCOUNTER — OFFICE VISIT (OUTPATIENT)
Dept: FAMILY MEDICINE CLINIC | Facility: CLINIC | Age: 70
End: 2025-02-17
Payer: MEDICARE

## 2025-02-17 VITALS
OXYGEN SATURATION: 98 % | WEIGHT: 146.6 LBS | RESPIRATION RATE: 16 BRPM | DIASTOLIC BLOOD PRESSURE: 74 MMHG | TEMPERATURE: 97.1 F | BODY MASS INDEX: 25.15 KG/M2 | SYSTOLIC BLOOD PRESSURE: 108 MMHG | HEART RATE: 87 BPM

## 2025-02-17 DIAGNOSIS — E78.5 DYSLIPIDEMIA: ICD-10-CM

## 2025-02-17 DIAGNOSIS — Z12.31 OTHER SCREENING MAMMOGRAM: ICD-10-CM

## 2025-02-17 DIAGNOSIS — Z23 NEED FOR IMMUNIZATION AGAINST INFLUENZA: Primary | ICD-10-CM

## 2025-02-17 DIAGNOSIS — E03.9 ACQUIRED HYPOTHYROIDISM: ICD-10-CM

## 2025-02-17 DIAGNOSIS — Z00.00 PREVENTATIVE HEALTH CARE: ICD-10-CM

## 2025-02-17 DIAGNOSIS — Z78.0 POSTMENOPAUSAL: ICD-10-CM

## 2025-02-17 DIAGNOSIS — Z79.891 NARCOTIC USE AGREEMENT EXISTS: ICD-10-CM

## 2025-02-17 PROCEDURE — G0439 PPPS, SUBSEQ VISIT: HCPCS | Performed by: NURSE PRACTITIONER

## 2025-02-17 PROCEDURE — 1170F FXNL STATUS ASSESSED: CPT | Performed by: NURSE PRACTITIONER

## 2025-02-17 PROCEDURE — 1126F AMNT PAIN NOTED NONE PRSNT: CPT | Performed by: NURSE PRACTITIONER

## 2025-02-17 RX ORDER — ALENDRONATE SODIUM 70 MG/1
70 TABLET ORAL
Qty: 4 TABLET | Refills: 2 | Status: SHIPPED | OUTPATIENT
Start: 2025-02-17

## 2025-02-17 RX ORDER — ZOLPIDEM TARTRATE 5 MG/1
5 TABLET ORAL NIGHTLY PRN
Qty: 90 TABLET | Refills: 1 | Status: SHIPPED | OUTPATIENT
Start: 2025-02-17

## 2025-02-17 RX ORDER — ROSUVASTATIN CALCIUM 20 MG/1
20 TABLET, COATED ORAL DAILY
Qty: 90 TABLET | Refills: 1 | Status: SHIPPED | OUTPATIENT
Start: 2025-02-17

## 2025-02-17 RX ORDER — LEVOTHYROXINE SODIUM 125 UG/1
125 TABLET ORAL DAILY
Qty: 90 TABLET | Refills: 1 | Status: SHIPPED | OUTPATIENT
Start: 2025-02-17

## 2025-02-17 NOTE — PROGRESS NOTES
Subjective   The ABCs of the Annual Wellness Visit  Medicare Wellness Visit      Anastasiya Terrell is a 69 y.o. patient who presents for a Medicare Wellness Visit.    The following portions of the patient's history were reviewed and   updated as appropriate: allergies, past family history, past medical history, past social history, past surgical history, and problem list.    Compared to one year ago, the patient's physical   health is the same.  Compared to one year ago, the patient's mental   health is the same.    Recent Hospitalizations:  She was not admitted to the hospital during the last year.     Current Medical Providers:  Patient Care Team:  Norma Rodriguez APRN as PCP - General (Nurse Practitioner)    Outpatient Medications Prior to Visit   Medication Sig Dispense Refill    cholecalciferol (VITAMIN D3) 25 MCG (1000 UT) tablet Take 1 tablet by mouth 3 (Three) Times a Week.      alendronate (FOSAMAX) 70 MG tablet Take 1 tablet by mouth Every 7 (Seven) Days. 4 tablet 2    levothyroxine (SYNTHROID, LEVOTHROID) 125 MCG tablet TAKE 1 TABLET BY MOUTH DAILY 90 tablet 1    rosuvastatin (CRESTOR) 20 MG tablet TAKE 1 TABLET BY MOUTH DAILY 90 tablet 1    zolpidem (Ambien) 5 MG tablet Take 1 tablet by mouth At Night As Needed for Sleep. 90 tablet 1     No facility-administered medications prior to visit.     No opioid medication identified on active medication list. I have reviewed chart for other potential  high risk medication/s and harmful drug interactions in the elderly.      Aspirin is not on active medication list.  Aspirin use is not indicated based on review of current medical condition/s. Risk of harm outweighs potential benefits.  .    Patient Active Problem List   Diagnosis    Hypothyroidism    Carcinoma of breast    Dyslipidemia    Eczema    Hypercalcemia    Insomnia    Chemotherapy-induced peripheral neuropathy    Palpitations    History of elevated glucose    BMI 24.0-24.9, adult     Advance Care Planning  "Advance Directive is not on file.  ACP discussion was declined by the patient. Patient does not have an advance directive, declines further assistance.            Objective   Vitals:    25 0921   BP: 108/74   BP Location: Left arm   Patient Position: Sitting   Cuff Size: Adult   Pulse: 87   Resp: 16   Temp: 97.1 °F (36.2 °C)   SpO2: 98%   Weight: 66.5 kg (146 lb 9.6 oz)       Estimated body mass index is 25.15 kg/m² as calculated from the following:    Height as of 24: 162.6 cm (64.02\").    Weight as of this encounter: 66.5 kg (146 lb 9.6 oz).    BMI is >= 25 and <30. (Overweight) The following options were offered after discussion;: exercise counseling/recommendations           Does the patient have evidence of cognitive impairment? No                                                                                                Health  Risk Assessment    Smoking Status:  Social History     Tobacco Use   Smoking Status Never   Smokeless Tobacco Never     Alcohol Consumption:  Social History     Substance and Sexual Activity   Alcohol Use Not Currently       Fall Risk Screen  STEADI Fall Risk Assessment was completed, and patient is at LOW risk for falls.Assessment completed on:2025    Depression Screening   Little interest or pleasure in doing things? Not at all   Feeling down, depressed, or hopeless? Not at all   PHQ-2 Total Score 0      Health Habits and Functional and Cognitive Screenin/17/2025     9:31 AM   Functional & Cognitive Status   Do you have difficulty preparing food and eating? No   Do you have difficulty bathing yourself, getting dressed or grooming yourself? No   Do you have difficulty using the toilet? No   Do you have difficulty moving around from place to place? No   Do you have trouble with steps or getting out of a bed or a chair? No   Current Diet Well Balanced Diet   Dental Exam Up to date   Eye Exam Up to date   Exercise (times per week) 5 times per week   Current " Exercises Include Walking;Stair Step Machine   Do you need help using the phone?  No   Are you deaf or do you have serious difficulty hearing?  No   Do you need help to go to places out of walking distance? No   Do you need help shopping? No   Do you need help preparing meals?  No   Do you need help with housework?  No   Do you need help with laundry? No   Do you need help taking your medications? No   Do you need help managing money? No   Do you ever drive or ride in a car without wearing a seat belt? No   Have you felt unusual stress, anger or loneliness in the last month? No   Who do you live with? Spouse   If you need help, do you have trouble finding someone available to you? No   Have you been bothered in the last four weeks by sexual problems? No   Do you have difficulty concentrating, remembering or making decisions? No           Age-appropriate Screening Schedule:  Refer to the list below for future screening recommendations based on patient's age, sex and/or medical conditions. Orders for these recommended tests are listed in the plan section. The patient has been provided with a written plan.    Health Maintenance List  Health Maintenance   Topic Date Due    Pneumococcal Vaccine 50+ (1 of 1 - PCV) Never done    ZOSTER VACCINE (1 of 2) Never done    TDAP/TD VACCINES (2 - Td or Tdap) 03/10/2022    BMI FOLLOWUP  03/31/2023    PAP SMEAR  03/01/2024    INFLUENZA VACCINE  Never done    COVID-19 Vaccine (6 - 2024-25 season) 09/01/2024    ANNUAL WELLNESS VISIT  11/30/2024    DXA SCAN  05/22/2025    LIPID PANEL  08/13/2025    MAMMOGRAM  04/12/2026    COLORECTAL CANCER SCREENING  11/16/2026    HEPATITIS C SCREENING  Completed                                                                                                                                                CMS Preventative Services Quick Reference  Risk Factors Identified During Encounter  None Identified    The above risks/problems have been discussed  with the patient.  Pertinent information has been shared with the patient in the After Visit Summary.  An After Visit Summary and PPPS were made available to the patient.    Follow Up:   Next Medicare Wellness visit to be scheduled in 1 year.         Additional E&M Note during same encounter follows:  Patient has additional, significant, and separately identifiable condition(s)/problem(s) that require work above and beyond the Medicare Wellness Visit     Chief Complaint  Medicare Wellness-subsequent (6 month f/u for medication check )    Subjective   69-year-old white female with history of hypothyroidism, insomnia, vitamin D deficiency, breast cancer, hyperlipidemia, chemo induced peripheral neuropathy who comes in today for Medicare wellness visit    Blood pressure 108/74 heart rate 86 she denies any chest pain, dyspnea, tachycardia or dizziness    On last blood work patient white blood count elevated we will recheck again today.  Lipid panel had also worsened.    Weight is 147 with a BMI of 25.2.  She has had 3 COVID vaccines has not been vaccinated for influenza and is up-to-date on her eye exams.  Will order her mammogram and DEXA scan at [facility] per her request and she will schedule.  She was told she no longer needs to do Pap smears by her OB/GYN and her next colonoscopy is due November 2026.  Patient does not have a living will and declined information on making 1          Fasting blood work  Mammogram DEXA scan at [facility]  Follow-up 6 months              Review of Systems   Constitutional: Negative.    HENT: Negative.     Respiratory: Negative.     Cardiovascular: Negative.    Gastrointestinal: Negative.    Genitourinary: Negative.    Musculoskeletal: Negative.    Skin: Negative.    Neurological: Negative.    Psychiatric/Behavioral: Negative.                Objective   Vital Signs:  /74 (BP Location: Left arm, Patient Position: Sitting, Cuff Size: Adult)   Pulse 87   Temp 97.1 °F (36.2 °C)   Resp 16    Wt 66.5 kg (146 lb 9.6 oz)   SpO2 98%   BMI 25.15 kg/m²   Physical Exam  Constitutional:       Appearance: Normal appearance.   HENT:      Head: Normocephalic.   Cardiovascular:      Rate and Rhythm: Normal rate and regular rhythm.      Pulses: Normal pulses.      Heart sounds: Normal heart sounds.   Pulmonary:      Effort: Pulmonary effort is normal.      Breath sounds: Normal breath sounds.   Abdominal:      General: Bowel sounds are normal.   Musculoskeletal:         General: Normal range of motion.   Skin:     General: Skin is warm.   Neurological:      General: No focal deficit present.      Mental Status: She is alert and oriented to person, place, and time.   Psychiatric:         Mood and Affect: Mood normal.         Behavior: Behavior normal.                       Assessment and Plan            Need for immunization against influenza         Acquired hypothyroidism    Orders:    TSH+Free T4    T3    Dyslipidemia    Orders:    Lipid Panel With LDL / HDL Ratio    rosuvastatin (CRESTOR) 20 MG tablet; Take 1 tablet by mouth Daily.    Preventative health care    Orders:    CBC & Differential    Comprehensive Metabolic Panel    Hemoglobin A1c    Narcotic use agreement exists    Orders:    ToxAssure Flex 24, Ur - Urine, Clean Catch    BMI 25.0-25.9,adult         Postmenopausal    Orders:    DEXA Bone Density Axial; Future    Other screening mammogram    Orders:    Mammo Screening Digital Tomosynthesis Bilateral With CAD; Future            Follow Up   No follow-ups on file.  Patient was given instructions and counseling regarding her condition or for health maintenance advice. Please see specific information pulled into the AVS if appropriate.

## 2025-02-17 NOTE — ASSESSMENT & PLAN NOTE
Orders:    Lipid Panel With LDL / HDL Ratio    rosuvastatin (CRESTOR) 20 MG tablet; Take 1 tablet by mouth Daily.

## 2025-02-18 LAB
ALBUMIN SERPL-MCNC: 4.6 G/DL (ref 3.9–4.9)
ALP SERPL-CCNC: 83 IU/L (ref 44–121)
ALT SERPL-CCNC: 24 IU/L (ref 0–32)
AST SERPL-CCNC: 27 IU/L (ref 0–40)
BASOPHILS # BLD AUTO: 0 X10E3/UL (ref 0–0.2)
BASOPHILS NFR BLD AUTO: 1 %
BILIRUB SERPL-MCNC: 1.1 MG/DL (ref 0–1.2)
BUN SERPL-MCNC: 14 MG/DL (ref 8–27)
BUN/CREAT SERPL: 14 (ref 12–28)
CALCIUM SERPL-MCNC: 9.7 MG/DL (ref 8.7–10.3)
CHLORIDE SERPL-SCNC: 104 MMOL/L (ref 96–106)
CHOLEST SERPL-MCNC: 216 MG/DL (ref 100–199)
CO2 SERPL-SCNC: 23 MMOL/L (ref 20–29)
CREAT SERPL-MCNC: 0.99 MG/DL (ref 0.57–1)
EGFRCR SERPLBLD CKD-EPI 2021: 61 ML/MIN/1.73
EOSINOPHIL # BLD AUTO: 0.2 X10E3/UL (ref 0–0.4)
EOSINOPHIL NFR BLD AUTO: 3 %
ERYTHROCYTE [DISTWIDTH] IN BLOOD BY AUTOMATED COUNT: 13 % (ref 11.7–15.4)
GLOBULIN SER CALC-MCNC: 2.4 G/DL (ref 1.5–4.5)
GLUCOSE SERPL-MCNC: 83 MG/DL (ref 70–99)
HBA1C MFR BLD: 5.3 % (ref 4.8–5.6)
HCT VFR BLD AUTO: 47.8 % (ref 34–46.6)
HDLC SERPL-MCNC: 57 MG/DL
HGB BLD-MCNC: 16.2 G/DL (ref 11.1–15.9)
IMM GRANULOCYTES # BLD AUTO: 0 X10E3/UL (ref 0–0.1)
IMM GRANULOCYTES NFR BLD AUTO: 0 %
LDLC SERPL CALC-MCNC: 127 MG/DL (ref 0–99)
LDLC/HDLC SERPL: 2.2 RATIO (ref 0–3.2)
LYMPHOCYTES # BLD AUTO: 1.8 X10E3/UL (ref 0.7–3.1)
LYMPHOCYTES NFR BLD AUTO: 32 %
MCH RBC QN AUTO: 30.3 PG (ref 26.6–33)
MCHC RBC AUTO-ENTMCNC: 33.9 G/DL (ref 31.5–35.7)
MCV RBC AUTO: 90 FL (ref 79–97)
MONOCYTES # BLD AUTO: 0.4 X10E3/UL (ref 0.1–0.9)
MONOCYTES NFR BLD AUTO: 8 %
NEUTROPHILS # BLD AUTO: 3.1 X10E3/UL (ref 1.4–7)
NEUTROPHILS NFR BLD AUTO: 56 %
PLATELET # BLD AUTO: 273 X10E3/UL (ref 150–450)
POTASSIUM SERPL-SCNC: 4.5 MMOL/L (ref 3.5–5.2)
PROT SERPL-MCNC: 7 G/DL (ref 6–8.5)
RBC # BLD AUTO: 5.34 X10E6/UL (ref 3.77–5.28)
SODIUM SERPL-SCNC: 141 MMOL/L (ref 134–144)
T3 SERPL-MCNC: 72 NG/DL (ref 71–180)
T4 FREE SERPL-MCNC: 1.67 NG/DL (ref 0.82–1.77)
TRIGL SERPL-MCNC: 181 MG/DL (ref 0–149)
TSH SERPL DL<=0.005 MIU/L-ACNC: 4.26 UIU/ML (ref 0.45–4.5)
VLDLC SERPL CALC-MCNC: 32 MG/DL (ref 5–40)
WBC # BLD AUTO: 5.6 X10E3/UL (ref 3.4–10.8)

## 2025-02-19 LAB
1OH-MIDAZOLAM UR QL SCN: NOT DETECTED NG/MG CREAT
6MAM UR QL SCN: NEGATIVE NG/ML
7AMINOCLONAZEPAM/CREAT UR: NOT DETECTED NG/MG CREAT
A-OH ALPRAZ/CREAT UR: NOT DETECTED NG/MG CREAT
A-OH-TRIAZOLAM/CREAT UR CFM: NOT DETECTED NG/MG CREAT
ACP UR QL CFM: NOT DETECTED
ALPRAZ/CREAT UR CFM: NOT DETECTED NG/MG CREAT
AMPHETAMINES UR QL SCN: NEGATIVE NG/ML
APAP UR QL SCN: NEGATIVE UG/ML
BARBITURATES UR QL SCN: NEGATIVE NG/ML
BENZODIAZ SCN METH UR: NEGATIVE
BUPRENORPHINE UR QL SCN: NEGATIVE
BUPRENORPHINE/CREAT UR: NOT DETECTED NG/MG CREAT
CANNABINOIDS UR QL SCN: NEGATIVE NG/ML
CARISOPRODOL UR QL: NEGATIVE NG/ML
CLONAZEPAM/CREAT UR CFM: NOT DETECTED NG/MG CREAT
COCAINE+BZE UR QL SCN: NEGATIVE NG/ML
CREAT UR-MCNC: 195 MG/DL
D-METHORPHAN UR-MCNC: NOT DETECTED NG/ML
D-METHORPHAN+LEVORPHANOL UR QL: NOT DETECTED
DESALKYLFLURAZ/CREAT UR: NOT DETECTED NG/MG CREAT
DIAZEPAM/CREAT UR: NOT DETECTED NG/MG CREAT
ETHANOL UR QL SCN: NEGATIVE G/DL
ETHANOL UR QL SCN: NEGATIVE NG/ML
FENTANYL CTO UR SCN-MCNC: NEGATIVE NG/ML
FENTANYL/CREAT UR: NOT DETECTED NG/MG CREAT
FLUNITRAZEPAM UR QL SCN: NOT DETECTED NG/MG CREAT
GABAPENTIN UR-MCNC: NEGATIVE UG/ML
HALLUCINOGENS UR: NEGATIVE
HYPNOTICS UR QL SCN: NORMAL
KETAMINE UR QL: NOT DETECTED
LORAZEPAM/CREAT UR: NOT DETECTED NG/MG CREAT
ME-PHENIDATE UR-MCNC: NOT DETECTED UG/ML
MEPERIDINE UR QL SCN: NEGATIVE NG/ML
METHADONE UR QL SCN: NEGATIVE NG/ML
METHADONE+METAB UR QL SCN: NEGATIVE NG/ML
MIDAZOLAM/CREAT UR CFM: NOT DETECTED NG/MG CREAT
MISCELLANEOUS, UR: NEGATIVE
NORBUPRENORPHINE/CREAT UR: NOT DETECTED NG/MG CREAT
NORDIAZEPAM/CREAT UR: NOT DETECTED NG/MG CREAT
NORFENTANYL/CREAT UR: NOT DETECTED NG/MG CREAT
NORFLUNITRAZEPAM UR-MCNC: NOT DETECTED NG/MG CREAT
NORKETAMINE UR-MCNC: NOT DETECTED UG/ML
OPIATES UR SCN-MCNC: NEGATIVE NG/ML
OXAZEPAM/CREAT UR: NOT DETECTED NG/MG CREAT
OXYCODONE CTO UR SCN-MCNC: NEGATIVE NG/ML
PCP UR QL SCN: NEGATIVE NG/ML
PPAA UR QL SCN: NOT DETECTED
PRESCRIBED MEDICATIONS: NORMAL
PROPOXYPH UR QL SCN: NEGATIVE NG/ML
SYMPATHOMIMETICS UR QL: NEGATIVE
TAPENTADOL CTO UR SCN-MCNC: NEGATIVE NG/ML
TEMAZEPAM/CREAT UR: NOT DETECTED NG/MG CREAT
TRAMADOL UR QL SCN: NEGATIVE NG/ML
ZALEPLON UR-MCNC: NOT DETECTED NG/ML
ZOLPIDEM PHENYL-4-CARB UR QL SCN: PRESENT
ZOLPIDEM UR QL SCN: PRESENT
ZOPICLONE-N-OXIDE UR-MCNC: NOT DETECTED NG/ML

## 2025-05-19 LAB — NCCN CRITERIA FLAG: ABNORMAL

## 2025-05-20 ENCOUNTER — RESULTS FOLLOW-UP (OUTPATIENT)
Facility: HOSPITAL | Age: 70
End: 2025-05-20
Payer: MEDICARE

## 2025-05-20 NOTE — PROGRESS NOTES
This patient recently completed the CARE risk assessment for a mammogram appointment. Based on the patient's responses, NCCN criteria for genetic testing was met. At the time of the assessment, the patient was provided with both written and video educational materials regarding genetic testing.    Navigator follow-up:   The patient declined genetic testing at the time of her assessment.  I sent the patient an email message (no TextH[employer]) with further information regarding our genetic testing program, asking for a call to discuss assessment results.

## 2025-05-23 ENCOUNTER — DOCUMENTATION (OUTPATIENT)
Dept: GENETICS | Facility: HOSPITAL | Age: 70
End: 2025-05-23
Payer: MEDICARE

## 2025-05-23 ENCOUNTER — HOSPITAL ENCOUNTER (OUTPATIENT)
Dept: MAMMOGRAPHY | Facility: HOSPITAL | Age: 70
Discharge: HOME | End: 2025-05-23
Payer: MEDICARE

## 2025-05-23 ENCOUNTER — HOSPITAL ENCOUNTER (OUTPATIENT)
Dept: BONE DENSITY | Facility: HOSPITAL | Age: 70
Discharge: HOME | End: 2025-05-23
Payer: MEDICARE

## 2025-05-23 DIAGNOSIS — Z12.31 OTHER SCREENING MAMMOGRAM: ICD-10-CM

## 2025-05-23 DIAGNOSIS — Z78.0 POSTMENOPAUSAL: ICD-10-CM

## 2025-05-23 PROCEDURE — 77067 SCR MAMMO BI INCL CAD: CPT

## 2025-05-23 PROCEDURE — 77063 BREAST TOMOSYNTHESIS BI: CPT

## 2025-05-23 PROCEDURE — 77080 DXA BONE DENSITY AXIAL: CPT

## 2025-05-23 NOTE — PROGRESS NOTES
Meets NCCN Criteria for Genetic Testing  Declines genetic testing? Y   Reason for decline? Declined At The Time Of Completing Assessment; Could Not Reach Patient   If Reason for Decline is Previous Testing    [facility] CARE     Non-CARE     Non-CARE Confirmation    Navigator Confirmed?     Patient Reported?     Elevated Tyrer-Cuzick Score ? Or Equal to 20%    Declines referral?     Reason for decline?

## 2025-05-23 NOTE — PROGRESS NOTES
The patient declined at time of assessment.  I have attempted to contact the patient via [employer] message to discuss the risk assessment results. The patient has not yet responded. My contact information was included in the [employer] message.

## 2025-08-21 ENCOUNTER — OFFICE VISIT (OUTPATIENT)
Dept: FAMILY MEDICINE CLINIC | Facility: CLINIC | Age: 70
End: 2025-08-21
Payer: MEDICARE

## 2025-08-21 VITALS
TEMPERATURE: 96.9 F | WEIGHT: 147.6 LBS | HEIGHT: 64 IN | RESPIRATION RATE: 16 BRPM | HEART RATE: 70 BPM | OXYGEN SATURATION: 99 % | DIASTOLIC BLOOD PRESSURE: 78 MMHG | SYSTOLIC BLOOD PRESSURE: 110 MMHG | BODY MASS INDEX: 25.2 KG/M2

## 2025-08-21 DIAGNOSIS — E78.5 DYSLIPIDEMIA: ICD-10-CM

## 2025-08-21 DIAGNOSIS — Z00.00 PREVENTATIVE HEALTH CARE: ICD-10-CM

## 2025-08-21 DIAGNOSIS — R53.83 OTHER FATIGUE: ICD-10-CM

## 2025-08-21 DIAGNOSIS — G47.00 INSOMNIA, UNSPECIFIED TYPE: ICD-10-CM

## 2025-08-21 DIAGNOSIS — E55.9 VITAMIN D DEFICIENCY: ICD-10-CM

## 2025-08-21 DIAGNOSIS — Z79.891 NARCOTIC USE AGREEMENT EXISTS: ICD-10-CM

## 2025-08-21 DIAGNOSIS — D75.1 POLYCYTHEMIA: ICD-10-CM

## 2025-08-21 DIAGNOSIS — E03.9 ACQUIRED HYPOTHYROIDISM: Primary | ICD-10-CM

## 2025-08-21 DIAGNOSIS — L65.9 HAIR LOSS: ICD-10-CM

## 2025-08-21 RX ORDER — LEVOTHYROXINE SODIUM 125 UG/1
125 TABLET ORAL DAILY
Qty: 90 TABLET | Refills: 1 | Status: SHIPPED | OUTPATIENT
Start: 2025-08-21

## 2025-08-21 RX ORDER — ROSUVASTATIN CALCIUM 20 MG/1
20 TABLET, COATED ORAL DAILY
Qty: 90 TABLET | Refills: 1 | Status: SHIPPED | OUTPATIENT
Start: 2025-08-21

## 2025-08-21 RX ORDER — ZOLPIDEM TARTRATE 5 MG/1
5 TABLET ORAL NIGHTLY PRN
Qty: 90 TABLET | Refills: 1 | Status: SHIPPED | OUTPATIENT
Start: 2025-08-21

## 2025-08-21 RX ORDER — CHOLECALCIFEROL (VITAMIN D3) 25 MCG
1000 TABLET ORAL 3 TIMES WEEKLY
Qty: 90 TABLET | Refills: 1 | Status: SHIPPED | OUTPATIENT
Start: 2025-08-22

## 2025-08-21 RX ORDER — ALENDRONATE SODIUM 70 MG/1
70 TABLET ORAL
Qty: 4 TABLET | Refills: 2 | Status: SHIPPED | OUTPATIENT
Start: 2025-08-21

## 2025-08-22 LAB
ALBUMIN SERPL-MCNC: 4.8 G/DL (ref 3.9–4.9)
ALP SERPL-CCNC: 93 IU/L (ref 44–121)
ALT SERPL-CCNC: 23 IU/L (ref 0–32)
AST SERPL-CCNC: 25 IU/L (ref 0–40)
BASOPHILS # BLD AUTO: 0.1 X10E3/UL (ref 0–0.2)
BASOPHILS NFR BLD AUTO: 1 %
BILIRUB SERPL-MCNC: 1 MG/DL (ref 0–1.2)
BUN SERPL-MCNC: 11 MG/DL (ref 8–27)
BUN/CREAT SERPL: 12 (ref 12–28)
CALCIUM SERPL-MCNC: 10.3 MG/DL (ref 8.7–10.3)
CHLORIDE SERPL-SCNC: 100 MMOL/L (ref 96–106)
CHOLEST SERPL-MCNC: 248 MG/DL (ref 100–199)
CO2 SERPL-SCNC: 23 MMOL/L (ref 20–29)
CREAT SERPL-MCNC: 0.93 MG/DL (ref 0.57–1)
EGFRCR SERPLBLD CKD-EPI 2021: 66 ML/MIN/1.73
EOSINOPHIL # BLD AUTO: 0.3 X10E3/UL (ref 0–0.4)
EOSINOPHIL NFR BLD AUTO: 5 %
ERYTHROCYTE [DISTWIDTH] IN BLOOD BY AUTOMATED COUNT: 13 % (ref 11.7–15.4)
GLOBULIN SER CALC-MCNC: 2.6 G/DL (ref 1.5–4.5)
GLUCOSE SERPL-MCNC: 86 MG/DL (ref 70–99)
HBA1C MFR BLD: 5.2 % (ref 4.8–5.6)
HCT VFR BLD AUTO: 50.6 % (ref 34–46.6)
HDLC SERPL-MCNC: 63 MG/DL
HGB BLD-MCNC: 16.3 G/DL (ref 11.1–15.9)
IMM GRANULOCYTES # BLD AUTO: 0 X10E3/UL (ref 0–0.1)
IMM GRANULOCYTES NFR BLD AUTO: 0 %
LDLC SERPL CALC-MCNC: 143 MG/DL (ref 0–99)
LDLC/HDLC SERPL: 2.3 RATIO (ref 0–3.2)
LYMPHOCYTES # BLD AUTO: 2 X10E3/UL (ref 0.7–3.1)
LYMPHOCYTES NFR BLD AUTO: 30 %
MCH RBC QN AUTO: 29.6 PG (ref 26.6–33)
MCHC RBC AUTO-ENTMCNC: 32.2 G/DL (ref 31.5–35.7)
MCV RBC AUTO: 92 FL (ref 79–97)
MONOCYTES # BLD AUTO: 0.5 X10E3/UL (ref 0.1–0.9)
MONOCYTES NFR BLD AUTO: 8 %
NEUTROPHILS # BLD AUTO: 3.8 X10E3/UL (ref 1.4–7)
NEUTROPHILS NFR BLD AUTO: 56 %
PLATELET # BLD AUTO: 283 X10E3/UL (ref 150–450)
POTASSIUM SERPL-SCNC: 5 MMOL/L (ref 3.5–5.2)
PROT SERPL-MCNC: 7.4 G/DL (ref 6–8.5)
RBC # BLD AUTO: 5.51 X10E6/UL (ref 3.77–5.28)
SODIUM SERPL-SCNC: 141 MMOL/L (ref 134–144)
T3 SERPL-MCNC: 93 NG/DL (ref 71–180)
T4 FREE SERPL-MCNC: 1.8 NG/DL (ref 0.82–1.77)
TRIGL SERPL-MCNC: 236 MG/DL (ref 0–149)
TSH SERPL DL<=0.005 MIU/L-ACNC: 1.07 UIU/ML (ref 0.45–4.5)
VIT B12 SERPL-MCNC: 457 PG/ML (ref 232–1245)
VLDLC SERPL CALC-MCNC: 42 MG/DL (ref 5–40)
WBC # BLD AUTO: 6.8 X10E3/UL (ref 3.4–10.8)

## 2025-08-25 LAB
1,25(OH)2D SERPL-MCNC: 53.3 PG/ML (ref 24.8–81.5)
1OH-MIDAZOLAM UR QL SCN: NOT DETECTED NG/MG CREAT
6MAM UR QL SCN: NEGATIVE NG/ML
7AMINOCLONAZEPAM/CREAT UR: NOT DETECTED NG/MG CREAT
A-OH ALPRAZ/CREAT UR: NOT DETECTED NG/MG CREAT
A-OH-TRIAZOLAM/CREAT UR CFM: NOT DETECTED NG/MG CREAT
ACP UR QL CFM: NOT DETECTED
ALPRAZ/CREAT UR CFM: NOT DETECTED NG/MG CREAT
AMPHETAMINES UR QL SCN: NEGATIVE NG/ML
APAP UR QL SCN: NEGATIVE UG/ML
BARBITURATES UR QL SCN: NEGATIVE NG/ML
BENZODIAZ SCN METH UR: NEGATIVE
BUPRENORPHINE UR QL SCN: NEGATIVE
BUPRENORPHINE/CREAT UR: NOT DETECTED NG/MG CREAT
CANNABINOIDS UR QL SCN: NEGATIVE NG/ML
CARISOPRODOL UR QL: NEGATIVE NG/ML
CLONAZEPAM/CREAT UR CFM: NOT DETECTED NG/MG CREAT
COCAINE+BZE UR QL SCN: NEGATIVE NG/ML
CREAT UR-MCNC: 27 MG/DL
D-METHORPHAN UR-MCNC: NOT DETECTED NG/ML
D-METHORPHAN+LEVORPHANOL UR QL: NOT DETECTED
DESALKYLFLURAZ/CREAT UR: NOT DETECTED NG/MG CREAT
DIAZEPAM/CREAT UR: NOT DETECTED NG/MG CREAT
ETHANOL UR QL SCN: NEGATIVE G/DL
ETHANOL UR QL SCN: NEGATIVE NG/ML
FENTANYL CTO UR SCN-MCNC: NEGATIVE NG/ML
FENTANYL/CREAT UR: NOT DETECTED NG/MG CREAT
FLUNITRAZEPAM UR QL SCN: NOT DETECTED NG/MG CREAT
GABAPENTIN UR-MCNC: NEGATIVE UG/ML
HALLUCINOGENS UR: NEGATIVE
HYPNOTICS UR QL SCN: NORMAL
KETAMINE UR QL: NOT DETECTED
LORAZEPAM/CREAT UR: NOT DETECTED NG/MG CREAT
ME-PHENIDATE UR-MCNC: NOT DETECTED UG/ML
MEPERIDINE UR QL SCN: NEGATIVE NG/ML
METHADONE UR QL SCN: NEGATIVE NG/ML
METHADONE+METAB UR QL SCN: NEGATIVE NG/ML
MIDAZOLAM/CREAT UR CFM: NOT DETECTED NG/MG CREAT
MISCELLANEOUS, UR: NEGATIVE
NORBUPRENORPHINE/CREAT UR: NOT DETECTED NG/MG CREAT
NORDIAZEPAM/CREAT UR: NOT DETECTED NG/MG CREAT
NORFENTANYL/CREAT UR: NOT DETECTED NG/MG CREAT
NORFLUNITRAZEPAM UR-MCNC: NOT DETECTED NG/MG CREAT
NORKETAMINE UR-MCNC: NOT DETECTED UG/ML
OPIATES UR SCN-MCNC: NEGATIVE NG/ML
OXAZEPAM/CREAT UR: NOT DETECTED NG/MG CREAT
OXYCODONE CTO UR SCN-MCNC: NEGATIVE NG/ML
PCP UR QL SCN: NEGATIVE NG/ML
PPAA UR QL SCN: NOT DETECTED
PRESCRIBED MEDICATIONS: NORMAL
PROPOXYPH UR QL SCN: NEGATIVE NG/ML
SYMPATHOMIMETICS UR QL: NEGATIVE
TAPENTADOL CTO UR SCN-MCNC: NEGATIVE NG/ML
TEMAZEPAM/CREAT UR: NOT DETECTED NG/MG CREAT
TRAMADOL UR QL SCN: NEGATIVE NG/ML
ZALEPLON UR-MCNC: NOT DETECTED NG/ML
ZOLPIDEM PHENYL-4-CARB UR QL SCN: PRESENT
ZOLPIDEM UR QL SCN: PRESENT
ZOPICLONE-N-OXIDE UR-MCNC: NOT DETECTED NG/ML